# Patient Record
Sex: MALE | Race: WHITE | NOT HISPANIC OR LATINO | URBAN - METROPOLITAN AREA
[De-identification: names, ages, dates, MRNs, and addresses within clinical notes are randomized per-mention and may not be internally consistent; named-entity substitution may affect disease eponyms.]

---

## 2021-07-03 ENCOUNTER — EMERGENCY (EMERGENCY)
Facility: HOSPITAL | Age: 61
LOS: 0 days | Discharge: HOME | End: 2021-07-03
Attending: EMERGENCY MEDICINE
Payer: MEDICAID

## 2021-07-03 VITALS
TEMPERATURE: 98 F | DIASTOLIC BLOOD PRESSURE: 85 MMHG | SYSTOLIC BLOOD PRESSURE: 154 MMHG | HEART RATE: 74 BPM | OXYGEN SATURATION: 100 % | RESPIRATION RATE: 16 BRPM

## 2021-07-03 VITALS — WEIGHT: 195.11 LBS

## 2021-07-03 DIAGNOSIS — R07.89 OTHER CHEST PAIN: ICD-10-CM

## 2021-07-03 DIAGNOSIS — I10 ESSENTIAL (PRIMARY) HYPERTENSION: ICD-10-CM

## 2021-07-03 LAB
ALBUMIN SERPL ELPH-MCNC: 4.5 G/DL — SIGNIFICANT CHANGE UP (ref 3.3–5)
ALP SERPL-CCNC: 57 U/L — SIGNIFICANT CHANGE UP (ref 40–120)
ALT FLD-CCNC: 33 U/L — SIGNIFICANT CHANGE UP (ref 12–78)
ANION GAP SERPL CALC-SCNC: 7 MMOL/L — SIGNIFICANT CHANGE UP (ref 5–17)
AST SERPL-CCNC: 25 U/L — SIGNIFICANT CHANGE UP (ref 15–37)
BASOPHILS # BLD AUTO: 0.02 K/UL — SIGNIFICANT CHANGE UP (ref 0–0.2)
BASOPHILS NFR BLD AUTO: 0.3 % — SIGNIFICANT CHANGE UP (ref 0–2)
BILIRUB SERPL-MCNC: 0.7 MG/DL — SIGNIFICANT CHANGE UP (ref 0.2–1.2)
BUN SERPL-MCNC: 21 MG/DL — SIGNIFICANT CHANGE UP (ref 7–23)
CALCIUM SERPL-MCNC: 9.6 MG/DL — SIGNIFICANT CHANGE UP (ref 8.5–10.1)
CHLORIDE SERPL-SCNC: 104 MMOL/L — SIGNIFICANT CHANGE UP (ref 96–108)
CO2 SERPL-SCNC: 25 MMOL/L — SIGNIFICANT CHANGE UP (ref 22–31)
CREAT SERPL-MCNC: 1.19 MG/DL — SIGNIFICANT CHANGE UP (ref 0.5–1.3)
EOSINOPHIL # BLD AUTO: 0.14 K/UL — SIGNIFICANT CHANGE UP (ref 0–0.5)
EOSINOPHIL NFR BLD AUTO: 1.9 % — SIGNIFICANT CHANGE UP (ref 0–6)
GLUCOSE SERPL-MCNC: 103 MG/DL — HIGH (ref 70–99)
HCT VFR BLD CALC: 40.2 % — SIGNIFICANT CHANGE UP (ref 39–50)
HGB BLD-MCNC: 14.5 G/DL — SIGNIFICANT CHANGE UP (ref 13–17)
IMM GRANULOCYTES NFR BLD AUTO: 0.4 % — SIGNIFICANT CHANGE UP (ref 0–1.5)
LIDOCAIN IGE QN: 133 U/L — SIGNIFICANT CHANGE UP (ref 73–393)
LYMPHOCYTES # BLD AUTO: 1.34 K/UL — SIGNIFICANT CHANGE UP (ref 1–3.3)
LYMPHOCYTES # BLD AUTO: 18.2 % — SIGNIFICANT CHANGE UP (ref 13–44)
MAGNESIUM SERPL-MCNC: 2.1 MG/DL — SIGNIFICANT CHANGE UP (ref 1.6–2.6)
MCHC RBC-ENTMCNC: 34.2 PG — HIGH (ref 27–34)
MCHC RBC-ENTMCNC: 36.1 GM/DL — HIGH (ref 32–36)
MCV RBC AUTO: 94.8 FL — SIGNIFICANT CHANGE UP (ref 80–100)
MONOCYTES # BLD AUTO: 0.63 K/UL — SIGNIFICANT CHANGE UP (ref 0–0.9)
MONOCYTES NFR BLD AUTO: 8.5 % — SIGNIFICANT CHANGE UP (ref 2–14)
NEUTROPHILS # BLD AUTO: 5.22 K/UL — SIGNIFICANT CHANGE UP (ref 1.8–7.4)
NEUTROPHILS NFR BLD AUTO: 70.7 % — SIGNIFICANT CHANGE UP (ref 43–77)
NT-PROBNP SERPL-SCNC: 22 PG/ML — SIGNIFICANT CHANGE UP (ref 0–125)
PLATELET # BLD AUTO: 191 K/UL — SIGNIFICANT CHANGE UP (ref 150–400)
POTASSIUM SERPL-MCNC: 4.1 MMOL/L — SIGNIFICANT CHANGE UP (ref 3.5–5.3)
POTASSIUM SERPL-SCNC: 4.1 MMOL/L — SIGNIFICANT CHANGE UP (ref 3.5–5.3)
PROT SERPL-MCNC: 7.5 GM/DL — SIGNIFICANT CHANGE UP (ref 6–8.3)
RBC # BLD: 4.24 M/UL — SIGNIFICANT CHANGE UP (ref 4.2–5.8)
RBC # FLD: 12.2 % — SIGNIFICANT CHANGE UP (ref 10.3–14.5)
SODIUM SERPL-SCNC: 136 MMOL/L — SIGNIFICANT CHANGE UP (ref 135–145)
TROPONIN I SERPL-MCNC: <0.015 NG/ML — SIGNIFICANT CHANGE UP (ref 0.01–0.04)
TROPONIN I SERPL-MCNC: <0.015 NG/ML — SIGNIFICANT CHANGE UP (ref 0.01–0.04)
WBC # BLD: 7.38 K/UL — SIGNIFICANT CHANGE UP (ref 3.8–10.5)
WBC # FLD AUTO: 7.38 K/UL — SIGNIFICANT CHANGE UP (ref 3.8–10.5)

## 2021-07-03 PROCEDURE — 83690 ASSAY OF LIPASE: CPT

## 2021-07-03 PROCEDURE — 99284 EMERGENCY DEPT VISIT MOD MDM: CPT | Mod: 25

## 2021-07-03 PROCEDURE — 71045 X-RAY EXAM CHEST 1 VIEW: CPT | Mod: 26

## 2021-07-03 PROCEDURE — 83880 ASSAY OF NATRIURETIC PEPTIDE: CPT

## 2021-07-03 PROCEDURE — 80053 COMPREHEN METABOLIC PANEL: CPT

## 2021-07-03 PROCEDURE — 85025 COMPLETE CBC W/AUTO DIFF WBC: CPT

## 2021-07-03 PROCEDURE — 99285 EMERGENCY DEPT VISIT HI MDM: CPT

## 2021-07-03 PROCEDURE — 71045 X-RAY EXAM CHEST 1 VIEW: CPT

## 2021-07-03 PROCEDURE — 93005 ELECTROCARDIOGRAM TRACING: CPT

## 2021-07-03 PROCEDURE — 36415 COLL VENOUS BLD VENIPUNCTURE: CPT

## 2021-07-03 PROCEDURE — 93010 ELECTROCARDIOGRAM REPORT: CPT

## 2021-07-03 PROCEDURE — 83735 ASSAY OF MAGNESIUM: CPT

## 2021-07-03 PROCEDURE — 84484 ASSAY OF TROPONIN QUANT: CPT

## 2021-07-03 RX ORDER — ASPIRIN/CALCIUM CARB/MAGNESIUM 324 MG
324 TABLET ORAL ONCE
Refills: 0 | Status: COMPLETED | OUTPATIENT
Start: 2021-07-03 | End: 2021-07-03

## 2021-07-03 RX ADMIN — Medication 324 MILLIGRAM(S): at 10:12

## 2021-07-03 NOTE — ED PROVIDER NOTE - NS_ ATTENDINGSCRIBEDETAILS _ED_A_ED_FT
I, Phani Romo MD,  performed the initial face to face bedside interview with this patient regarding history of present illness, review of symptoms and relevant past medical, social and family history.  I completed an independent physical examination.  I was the initial provider who evaluated this patient.  The history, relevant review of systems, past medical and surgical history, medical decision making, and physical examination was documented by the scribe in my presence and I attest to the accuracy of the documentation.

## 2021-07-03 NOTE — ED PROVIDER NOTE - NSFOLLOWUPINSTRUCTIONS_ED_ALL_ED_FT
1. return for worsening symptoms or anything concerning to you  2. take all home meds as prescribed  3. follow up with your pmd call to make an appointment  4. follow up with cardiology see attached    Chest Pain    WHAT YOU NEED TO KNOW:    Chest pain can be caused by a range of conditions, from not serious to life-threatening. Chest pain can be a symptom of a digestive problem, such as acid reflux or a stomach ulcer. An anxiety attack or a strong emotion, such as anger, can also cause chest pain. Infection, inflammation, or a fracture in the bones or cartilage in your chest can cause pain or discomfort. Sometimes chest pain or pressure is caused by poor blood flow to your heart (angina). Chest pain may also be caused by life-threatening conditions such as a heart attack or blood clot in your lungs.     DISCHARGE INSTRUCTIONS:    Call 911 if:     You have any of the following signs of a heart attack:   Squeezing, pressure, or pain in your chest       and any of the following:   Discomfort or pain in your back, neck, jaw, stomach, or arm       Shortness of breath      Nausea or vomiting      Lightheadedness or a sudden cold sweat        Return to the emergency department if:     You have chest discomfort that gets worse, even with medicine.      You cough or vomit blood.       Your bowel movements are black or bloody.       You cannot stop vomiting, or it hurts to swallow.     Contact your healthcare provider if:     You have questions or concerns about your condition or care.        Medicines:     Medicines may be given to treat the cause of your chest pain. Examples include pain medicine, anxiety medicine, or medicines to increase blood flow to your heart.       Do not take certain medicines without asking your healthcare provider first. These include NSAIDs, herbal or vitamin supplements, or hormones (estrogen or progestin).       Take your medicine as directed. Contact your healthcare provider if you think your medicine is not helping or if you have side effects. Tell him or her if you are allergic to any medicine. Keep a list of the medicines, vitamins, and herbs you take. Include the amounts, and when and why you take them. Bring the list or the pill bottles to follow-up visits. Carry your medicine list with you in case of an emergency.    Follow up with your healthcare provider within 72 hours, or as directed: You may need to return for more tests to find the cause of your chest pain. You may be referred to a specialist, such as a cardiologist or gastroenterologist. Write down your questions so you remember to ask them during your visits.     Healthy living tips: The following are general healthy guidelines. If your chest pain is caused by a heart problem, your healthcare provider will give you specific guidelines to follow.    Do not smoke. Nicotine and other chemicals in cigarettes and cigars can cause lung and heart damage. Ask your healthcare provider for information if you currently smoke and need help to quit. E-cigarettes or smokeless tobacco still contain nicotine. Talk to your healthcare provider before you use these products.       Eat a variety of healthy, low-fat, low-salt foods. Healthy foods include fruits, vegetables, whole-grain breads, low-fat dairy products, beans, lean meats, and fish. Ask for more information about a heart healthy diet.      Drink plenty of water every day. Your body is made of mostly water. Water helps your body to control your temperature and blood pressure. Ask your healthcare provider how much water you should drink every day.      Ask about activity. Your healthcare provider will tell you which activities to limit or avoid. Ask when you can drive, return to work, and have sex. Ask about the best exercise plan for you.      Maintain a healthy weight. Ask your healthcare provider how much you should weigh. Ask him or her to help you create a weight loss plan if you are overweight.       Get the flu and pneumonia vaccines. All adults should get the influenza (flu) vaccine. Get it every year as soon as it becomes available. The pneumococcal vaccine is given to adults aged 65 years or older. The vaccine is given every 5 years to prevent pneumococcal disease, such as pneumonia.    If you have a stent:     Carry your stent card with you at all times.       Let all healthcare providers know that you have a stent.

## 2021-07-03 NOTE — ED ADULT NURSE NOTE - TEMPLATE
Mirvaso Counseling: Mirvaso is a topical medication which can decrease superficial blood flow where applied. Side effects are uncommon and include stinging, redness and allergic reactions. Cardiac

## 2021-07-03 NOTE — ED PROVIDER NOTE - NS ED ROS FT
Constitutional: No fever or chills  Eyes: No visual changes  HEENT: No throat pain  CV: + chest pain  Resp: No SOB no cough  GI: No abd pain, nausea or vomiting  : No dysuria  MSK: No musculoskeletal pain  Skin: No rash  Neuro: No headache Constitutional: No fever or chills  Eyes: No visual changes  HEENT: No throat pain  CV: + chest pain, + chest tightness  Resp: No SOB no cough  GI: No abd pain, nausea or vomiting  : No dysuria  MSK: No musculoskeletal pain  Skin: No rash  Neuro: No headache

## 2021-07-03 NOTE — ED PROVIDER NOTE - CARE PROVIDER_API CALL
Antonio Pimentel (MD)  Cardiovascular Disease  [address], [address]  [place] [address]  Phone: [phone]  Fax: [phone]  Follow Up Time: 1-3 Days

## 2021-07-03 NOTE — ED PROVIDER NOTE - OBJECTIVE STATEMENT
62 y/o M with PMHx of hypertension presents to the ED c/o chest pain and constant chest tightness since this morning. Pt states he had a EKG done a couple weeks ago which showed abnormalities. This morning, pt states he woke up with tightness in chest.  He reports he went to the gym and did a cardio workout for 45 minutes and was still experiencing chest tightness.    Denies nausea, sweating, dizziness, sob. 62 y/o M with PMHx of hypertension presents to the ED c/o chest pain and constant chest tightness since waking up this morning. Pt states he had a EKG done a couple weeks ago which showed abnormalities. This morning, pt states he woke up with tightness in chest.  He reports he went to the gym and did a cardio workout for 45 minutes and was still experiencing chest tightness.    Denies nausea, sweating, dizziness, sob. 62 y/o M with PMHx of hypertension presents to the ED c/o left side chest pain and chest tightness which has been constant since this morning. Pt states he had a EKG done a couple weeks ago which showed abnormalities. This morning, pt states he woke up with tightness in chest.  He reports he went to the gym and did a cardio workout for 45 minutes and was still experiencing chest tightness.    Denies nausea, sweating, dizziness, sob. 62 y/o M with PMHx of hypertension presents to the ED c/o left side chest pain and chest tightness which has been constant since this morning. Pt states he had a EKG done a couple weeks ago which showed abnormalities. This morning, pt states he woke up with tightness in chest.  He reports he went to the gym and did a cardio workout for 45 minutes and was still experiencing chest tightness.  now resolved. non-radiating mild   Denies nausea, sweating, dizziness, sob.

## 2021-07-03 NOTE — ED PROVIDER NOTE - PROGRESS NOTE DETAILS
trop negative x 2 pt feeling well no tele events vss. will dc with cards follow up. Phani Romo M.D., Attending Physician

## 2021-07-03 NOTE — ED PROVIDER NOTE - PHYSICAL EXAMINATION
Constitutional: NAD AAOx3  Eyes: PERRLA EOMI  Head: Normocephalic atraumatic  Mouth: MMM  Cardiac: regular rate   Resp: Lungs CTAB  GI: Abd s/nt/nd  Neuro: CN2-12 intact  Skin: No rashes Constitutional: NAD AAOx3  Eyes: PERRLA EOMI  Head: Normocephalic atraumatic  Mouth: MMM  Cardiac: regular rate normal peripheral pulses no LE swelling  Resp: Lungs CTAB  GI: Abd s/nt/nd  Neuro: CN2-12 intact  Skin: No rashes

## 2021-07-03 NOTE — ED PROVIDER NOTE - PATIENT PORTAL LINK FT
You can access the FollowMyHealth Patient Portal offered by [facility] by registering at the following website: [email or web address]. By joining [facility]’s FollowMyHealth portal, you will also be able to view your health information using other applications (apps) compatible with our system.

## 2021-07-03 NOTE — ED PROVIDER NOTE - CLINICAL SUMMARY MEDICAL DECISION MAKING FREE TEXT BOX
62 y/o M with PMHx of hypertension presents to the ED with left side chest pain, which was constant this morning. Started before exercise and has now improved after exercising for 45 minutes. Exam nonfocal, heart score 3. Will delta trop, reassess.

## 2021-07-03 NOTE — ED ADULT TRIAGE NOTE - CHIEF COMPLAINT QUOTE
pt presents to ED due to chest pain and tightness started this AM when he woke up pt states he was seen last week and had an irregular heartbeat

## 2021-07-03 NOTE — ED ADULT NURSE NOTE - OBJECTIVE STATEMENT
Patient states he went to the gym this morning; upon returning he felt some "chest pressure" and "not feeling right" Patient also states he had an EKG 3 weeks ago which showed "changes" and was supposed to make an appt with his cardiologist but has not. Patient color good . Sinus rhythm on the cardiac monitor

## 2021-09-15 PROBLEM — Z00.00 ENCOUNTER FOR PREVENTIVE HEALTH EXAMINATION: Status: ACTIVE | Noted: 2021-09-15

## 2021-09-16 PROBLEM — I10 ESSENTIAL (PRIMARY) HYPERTENSION: Chronic | Status: ACTIVE | Noted: 2021-07-03

## 2021-09-22 ENCOUNTER — NON-APPOINTMENT (OUTPATIENT)
Age: 61
End: 2021-09-22

## 2021-09-22 ENCOUNTER — APPOINTMENT (OUTPATIENT)
Dept: RHEUMATOLOGY | Facility: CLINIC | Age: 61
End: 2021-09-22
Payer: MEDICAID

## 2021-09-22 VITALS
TEMPERATURE: 98 F | HEART RATE: 88 BPM | HEIGHT: 73 IN | BODY MASS INDEX: 27.83 KG/M2 | DIASTOLIC BLOOD PRESSURE: 103 MMHG | SYSTOLIC BLOOD PRESSURE: 178 MMHG | WEIGHT: 210 LBS | OXYGEN SATURATION: 98 %

## 2021-09-22 PROCEDURE — 99204 OFFICE O/P NEW MOD 45 MIN: CPT

## 2021-09-27 ENCOUNTER — APPOINTMENT (OUTPATIENT)
Dept: RADIOLOGY | Facility: CLINIC | Age: 61
End: 2021-09-27
Payer: MEDICAID

## 2021-09-27 ENCOUNTER — OUTPATIENT (OUTPATIENT)
Dept: OUTPATIENT SERVICES | Facility: HOSPITAL | Age: 61
LOS: 1 days | End: 2021-09-27
Payer: MEDICAID

## 2021-09-27 DIAGNOSIS — M10.9 GOUT, UNSPECIFIED: ICD-10-CM

## 2021-09-27 PROCEDURE — 73620 X-RAY EXAM OF FOOT: CPT

## 2021-09-27 PROCEDURE — 73620 X-RAY EXAM OF FOOT: CPT | Mod: 26,50

## 2021-10-07 ENCOUNTER — INPATIENT (INPATIENT)
Facility: HOSPITAL | Age: 61
LOS: 2 days | Discharge: HOME HEALTH | DRG: 197 | End: 2021-10-10
Attending: INTERNAL MEDICINE | Admitting: INTERNAL MEDICINE
Payer: MEDICAID

## 2021-10-07 VITALS
RESPIRATION RATE: 18 BRPM | DIASTOLIC BLOOD PRESSURE: 115 MMHG | SYSTOLIC BLOOD PRESSURE: 196 MMHG | HEART RATE: 102 BPM | WEIGHT: 211.86 LBS | TEMPERATURE: 98 F | HEIGHT: 73 IN

## 2021-10-07 DIAGNOSIS — I26.99 OTHER PULMONARY EMBOLISM WITHOUT ACUTE COR PULMONALE: ICD-10-CM

## 2021-10-07 LAB
ALBUMIN SERPL ELPH-MCNC: 4.3 G/DL — SIGNIFICANT CHANGE UP (ref 3.3–5)
ALP SERPL-CCNC: 76 U/L — SIGNIFICANT CHANGE UP (ref 40–120)
ALT FLD-CCNC: 36 U/L — SIGNIFICANT CHANGE UP (ref 12–78)
ANION GAP SERPL CALC-SCNC: 7 MMOL/L — SIGNIFICANT CHANGE UP (ref 5–17)
APTT BLD: 23 SEC — LOW (ref 27.5–35.5)
AST SERPL-CCNC: 26 U/L — SIGNIFICANT CHANGE UP (ref 15–37)
BASOPHILS # BLD AUTO: 0.03 K/UL — SIGNIFICANT CHANGE UP (ref 0–0.2)
BASOPHILS NFR BLD AUTO: 0.4 % — SIGNIFICANT CHANGE UP (ref 0–2)
BILIRUB SERPL-MCNC: 0.8 MG/DL — SIGNIFICANT CHANGE UP (ref 0.2–1.2)
BUN SERPL-MCNC: 17 MG/DL — SIGNIFICANT CHANGE UP (ref 7–23)
CALCIUM SERPL-MCNC: 9.5 MG/DL — SIGNIFICANT CHANGE UP (ref 8.5–10.1)
CHLORIDE SERPL-SCNC: 107 MMOL/L — SIGNIFICANT CHANGE UP (ref 96–108)
CO2 SERPL-SCNC: 26 MMOL/L — SIGNIFICANT CHANGE UP (ref 22–31)
CREAT SERPL-MCNC: 1.11 MG/DL — SIGNIFICANT CHANGE UP (ref 0.5–1.3)
EOSINOPHIL # BLD AUTO: 0.18 K/UL — SIGNIFICANT CHANGE UP (ref 0–0.5)
EOSINOPHIL NFR BLD AUTO: 2.4 % — SIGNIFICANT CHANGE UP (ref 0–6)
GLUCOSE SERPL-MCNC: 107 MG/DL — HIGH (ref 70–99)
HCT VFR BLD CALC: 36.3 % — LOW (ref 39–50)
HGB BLD-MCNC: 12.8 G/DL — LOW (ref 13–17)
IMM GRANULOCYTES NFR BLD AUTO: 0.3 % — SIGNIFICANT CHANGE UP (ref 0–1.5)
INR BLD: 1.1 RATIO — SIGNIFICANT CHANGE UP (ref 0.88–1.16)
LYMPHOCYTES # BLD AUTO: 1.13 K/UL — SIGNIFICANT CHANGE UP (ref 1–3.3)
LYMPHOCYTES # BLD AUTO: 14.8 % — SIGNIFICANT CHANGE UP (ref 13–44)
MAGNESIUM SERPL-MCNC: 2 MG/DL — SIGNIFICANT CHANGE UP (ref 1.6–2.6)
MCHC RBC-ENTMCNC: 34.1 PG — HIGH (ref 27–34)
MCHC RBC-ENTMCNC: 35.3 GM/DL — SIGNIFICANT CHANGE UP (ref 32–36)
MCV RBC AUTO: 96.8 FL — SIGNIFICANT CHANGE UP (ref 80–100)
MONOCYTES # BLD AUTO: 0.65 K/UL — SIGNIFICANT CHANGE UP (ref 0–0.9)
MONOCYTES NFR BLD AUTO: 8.5 % — SIGNIFICANT CHANGE UP (ref 2–14)
NEUTROPHILS # BLD AUTO: 5.61 K/UL — SIGNIFICANT CHANGE UP (ref 1.8–7.4)
NEUTROPHILS NFR BLD AUTO: 73.6 % — SIGNIFICANT CHANGE UP (ref 43–77)
NT-PROBNP SERPL-SCNC: 118 PG/ML — SIGNIFICANT CHANGE UP (ref 0–125)
PLATELET # BLD AUTO: 191 K/UL — SIGNIFICANT CHANGE UP (ref 150–400)
POTASSIUM SERPL-MCNC: 3.7 MMOL/L — SIGNIFICANT CHANGE UP (ref 3.5–5.3)
POTASSIUM SERPL-SCNC: 3.7 MMOL/L — SIGNIFICANT CHANGE UP (ref 3.5–5.3)
PROT SERPL-MCNC: 7.8 GM/DL — SIGNIFICANT CHANGE UP (ref 6–8.3)
PROTHROM AB SERPL-ACNC: 12.7 SEC — SIGNIFICANT CHANGE UP (ref 10.6–13.6)
RBC # BLD: 3.75 M/UL — LOW (ref 4.2–5.8)
RBC # FLD: 12.3 % — SIGNIFICANT CHANGE UP (ref 10.3–14.5)
SODIUM SERPL-SCNC: 140 MMOL/L — SIGNIFICANT CHANGE UP (ref 135–145)
TROPONIN I, HIGH SENSITIVITY RESULT: 14 NG/L — SIGNIFICANT CHANGE UP
WBC # BLD: 7.62 K/UL — SIGNIFICANT CHANGE UP (ref 3.8–10.5)
WBC # FLD AUTO: 7.62 K/UL — SIGNIFICANT CHANGE UP (ref 3.8–10.5)

## 2021-10-07 PROCEDURE — U0005: CPT

## 2021-10-07 PROCEDURE — 86769 SARS-COV-2 COVID-19 ANTIBODY: CPT

## 2021-10-07 PROCEDURE — 36415 COLL VENOUS BLD VENIPUNCTURE: CPT

## 2021-10-07 PROCEDURE — 86803 HEPATITIS C AB TEST: CPT

## 2021-10-07 PROCEDURE — 85027 COMPLETE CBC AUTOMATED: CPT

## 2021-10-07 PROCEDURE — 71275 CT ANGIOGRAPHY CHEST: CPT | Mod: 26,MA

## 2021-10-07 PROCEDURE — 99285 EMERGENCY DEPT VISIT HI MDM: CPT

## 2021-10-07 PROCEDURE — 93306 TTE W/DOPPLER COMPLETE: CPT

## 2021-10-07 PROCEDURE — 74176 CT ABD & PELVIS W/O CONTRAST: CPT

## 2021-10-07 PROCEDURE — 80048 BASIC METABOLIC PNL TOTAL CA: CPT

## 2021-10-07 PROCEDURE — U0003: CPT

## 2021-10-07 PROCEDURE — 80053 COMPREHEN METABOLIC PANEL: CPT

## 2021-10-07 PROCEDURE — 85730 THROMBOPLASTIN TIME PARTIAL: CPT

## 2021-10-07 PROCEDURE — 93010 ELECTROCARDIOGRAM REPORT: CPT

## 2021-10-07 RX ORDER — HEPARIN SODIUM 5000 [USP'U]/ML
7500 INJECTION INTRAVENOUS; SUBCUTANEOUS EVERY 6 HOURS
Refills: 0 | Status: DISCONTINUED | OUTPATIENT
Start: 2021-10-07 | End: 2021-10-08

## 2021-10-07 RX ORDER — AMLODIPINE BESYLATE 2.5 MG/1
5 TABLET ORAL ONCE
Refills: 0 | Status: COMPLETED | OUTPATIENT
Start: 2021-10-07 | End: 2021-10-07

## 2021-10-07 RX ORDER — HEPARIN SODIUM 5000 [USP'U]/ML
7500 INJECTION INTRAVENOUS; SUBCUTANEOUS ONCE
Refills: 0 | Status: COMPLETED | OUTPATIENT
Start: 2021-10-07 | End: 2021-10-07

## 2021-10-07 RX ORDER — HEPARIN SODIUM 5000 [USP'U]/ML
INJECTION INTRAVENOUS; SUBCUTANEOUS
Qty: 25000 | Refills: 0 | Status: DISCONTINUED | OUTPATIENT
Start: 2021-10-07 | End: 2021-10-08

## 2021-10-07 RX ORDER — HEPARIN SODIUM 5000 [USP'U]/ML
3500 INJECTION INTRAVENOUS; SUBCUTANEOUS EVERY 6 HOURS
Refills: 0 | Status: DISCONTINUED | OUTPATIENT
Start: 2021-10-07 | End: 2021-10-08

## 2021-10-07 RX ADMIN — AMLODIPINE BESYLATE 5 MILLIGRAM(S): 2.5 TABLET ORAL at 18:54

## 2021-10-07 RX ADMIN — Medication 10 MILLIGRAM(S): at 18:54

## 2021-10-07 RX ADMIN — HEPARIN SODIUM 1700 UNIT(S)/HR: 5000 INJECTION INTRAVENOUS; SUBCUTANEOUS at 21:17

## 2021-10-07 RX ADMIN — HEPARIN SODIUM 7500 UNIT(S): 5000 INJECTION INTRAVENOUS; SUBCUTANEOUS at 21:17

## 2021-10-07 NOTE — ED STATDOCS - PHYSICAL EXAMINATION
*GEN: No acute distress, well appearing   *HEAD: Normocephalic, Atraumatic  *EYES/NOSE: b/l Pupils symmetric & Reactive to ligth, EOMI b/l  *THROAT: airway patent, moist mucous membranes  *NECK: Neck supple  *PULMONARY: No Respiratory distress, symmetric b/l chest rise  *CARDIAC: s1s2, regular rhythm   *ABDOMEN:  Non Tender, Non Distended, soft, no guarding, no rebound, no masses   *BACK: no CVA tenderness, No midline vertebral tenderness to palpation   *EXTREMITIES: symmetric pulses, 2+ DP & radial pulses, no cyanosis. (+) R LE edema  *SKIN: no rash, no bruising   *NEUROLOGIC: alert,  full active & passive ROM in all 4 extremities,   *PSYCH: appropriate concern about symptoms, pleasant

## 2021-10-07 NOTE — ED ADULT NURSE NOTE - OBJECTIVE STATEMENT
Patient states he was sent here by his primary physician for a "blood clot" in his right leg. Patient is alert and oriented right leg swelling noted

## 2021-10-07 NOTE — ED STATDOCS - CARE PLAN
1 Principal Discharge DX:	Deep vein thrombosis (DVT) of femoral vein of right lower extremity  Secondary Diagnosis:	Leg swelling   Principal Discharge DX:	Multiple pulmonary emboli  Secondary Diagnosis:	Leg swelling  Secondary Diagnosis:	DVT, lower extremity

## 2021-10-07 NOTE — ED STATDOCS - PROGRESS NOTE DETAILS
60 yo male with a PMH of htn, gout presents with RLE swelling x 1 week. Pt was sent for outpt sono which showed dvt to the R common femoral and R popliteal. Denies cp, sob, fever, cough. WIll check labs, CT, meds for his htn and reeval. Pt hypertensive because states that he is nervous and did take hies medication today. -Damien Carrillo PA-C Spoke with Dr. Menezes. States no indication for echo. States to start heparin and admit to CICU/stepdown. -Damien Carrillo PA-C

## 2021-10-07 NOTE — ED STATDOCS - CLINICAL SUMMARY MEDICAL DECISION MAKING FREE TEXT BOX
Extensive R LE DVT. Pt without any CP, difficulty breathing, but it tachycardic. Will get CTA. Extensive R LE DVT. Pt without any CP, difficulty breathing, but is tachycardic. Will get CTA to eval for PE

## 2021-10-07 NOTE — ED STATDOCS - ATTENDING CONTRIBUTION TO CARE
I, Jc Pollock MD,  performed the initial face to face bedside interview with this patient regarding history of present illness, review of symptoms and relevant past medical, social and family history.  I completed an independent physical examination.  I was the initial provider who evaluated this patient. I have signed out the follow up of any pending tests (i.e. labs, radiological studies) to the ACP.  The ACP will complete the work up, interpret tests, administer medications if necessary and reassess the patient for an appropriate disposition.  If questions arise the ACP is expected to contact me for consultation. In the current work-flow I usually don't get to speak to nor reassess patients for final disposition once I sign the case out to the ACP (Unless otherwise specifically documented by me).

## 2021-10-07 NOTE — ED STATDOCS - OBJECTIVE STATEMENT
Pertinent HPI/PMH/PSH/FHx/SHx and Review of Systems contained within  HPI:  Patient p/w CC R LE swelling x 1 week, new onset. OP sono showed a DVT to the R LE. The OP sono impression: positive for extensive deep venous thrombosis extending from the common femoral vein, superficial femoral vein, popliteal to the calf veins. No CP, difficulty breathing. Denies long distance travel or long car rides recently. Denies hx of DVT. NKDA.   PMH/PSH relevant for:  HTN on Vasotec, Gout on Allopurinol   ROS negative for: fever, Chest pain, SOB, Nausea, vomiting, diarrhea, abdominal pain, dysuria    FamilyHx and SocialHx not otherwise contributory

## 2021-10-07 NOTE — ED STATDOCS - NS ED ROS FT
Review of Systems:  	•	CONSTITUTIONAL: no fever  	•	SKIN: no rash  	•	RESPIRATORY: no shortness of breath  	•	CARDIAC: no chest pain  	•	GI:  no abd pain, no nausea, no vomiting, no diarrhea  	•	GENITO-URINARY:  no dysuria  	•	MUSCULOSKELETAL:  no back pain. (+) R LE swelling  	•	NEUROLOGIC: no weakness  	•	ALLERGY: no rhinorrhea  	•	PSYSCHIATRIC: appropriate concern about symptoms

## 2021-10-07 NOTE — PHARMACOTHERAPY INTERVENTION NOTE - COMMENTS
Med history complete. Medication and allergies review with patient and compared to Drfirst. Medication related questions answered.

## 2021-10-08 LAB
ALBUMIN SERPL ELPH-MCNC: 4 G/DL — SIGNIFICANT CHANGE UP (ref 3.3–5)
ALP SERPL-CCNC: 75 U/L — SIGNIFICANT CHANGE UP (ref 40–120)
ALT FLD-CCNC: 29 U/L — SIGNIFICANT CHANGE UP (ref 12–78)
ANION GAP SERPL CALC-SCNC: 8 MMOL/L — SIGNIFICANT CHANGE UP (ref 5–17)
APTT BLD: 109.9 SEC — HIGH (ref 27.5–35.5)
APTT BLD: 72.5 SEC — HIGH (ref 27.5–35.5)
AST SERPL-CCNC: 26 U/L — SIGNIFICANT CHANGE UP (ref 15–37)
BILIRUB SERPL-MCNC: 0.9 MG/DL — SIGNIFICANT CHANGE UP (ref 0.2–1.2)
BUN SERPL-MCNC: 12 MG/DL — SIGNIFICANT CHANGE UP (ref 7–23)
CALCIUM SERPL-MCNC: 9.1 MG/DL — SIGNIFICANT CHANGE UP (ref 8.5–10.1)
CHLORIDE SERPL-SCNC: 105 MMOL/L — SIGNIFICANT CHANGE UP (ref 96–108)
CO2 SERPL-SCNC: 25 MMOL/L — SIGNIFICANT CHANGE UP (ref 22–31)
COVID-19 SPIKE DOMAIN AB INTERP: POSITIVE
COVID-19 SPIKE DOMAIN ANTIBODY RESULT: 84.3 U/ML — HIGH
CREAT SERPL-MCNC: 0.95 MG/DL — SIGNIFICANT CHANGE UP (ref 0.5–1.3)
GLUCOSE SERPL-MCNC: 104 MG/DL — HIGH (ref 70–99)
HCT VFR BLD CALC: 35.7 % — LOW (ref 39–50)
HCT VFR BLD CALC: 36.7 % — LOW (ref 39–50)
HCV AB S/CO SERPL IA: 0.07 S/CO — SIGNIFICANT CHANGE UP (ref 0–0.99)
HCV AB SERPL-IMP: SIGNIFICANT CHANGE UP
HGB BLD-MCNC: 12.8 G/DL — LOW (ref 13–17)
HGB BLD-MCNC: 12.9 G/DL — LOW (ref 13–17)
MCHC RBC-ENTMCNC: 33.9 PG — SIGNIFICANT CHANGE UP (ref 27–34)
MCHC RBC-ENTMCNC: 34.6 PG — HIGH (ref 27–34)
MCHC RBC-ENTMCNC: 34.9 GM/DL — SIGNIFICANT CHANGE UP (ref 32–36)
MCHC RBC-ENTMCNC: 36.1 GM/DL — HIGH (ref 32–36)
MCV RBC AUTO: 95.7 FL — SIGNIFICANT CHANGE UP (ref 80–100)
MCV RBC AUTO: 97.1 FL — SIGNIFICANT CHANGE UP (ref 80–100)
PLATELET # BLD AUTO: 203 K/UL — SIGNIFICANT CHANGE UP (ref 150–400)
PLATELET # BLD AUTO: 216 K/UL — SIGNIFICANT CHANGE UP (ref 150–400)
POTASSIUM SERPL-MCNC: 3.7 MMOL/L — SIGNIFICANT CHANGE UP (ref 3.5–5.3)
POTASSIUM SERPL-SCNC: 3.7 MMOL/L — SIGNIFICANT CHANGE UP (ref 3.5–5.3)
PROT SERPL-MCNC: 7.1 GM/DL — SIGNIFICANT CHANGE UP (ref 6–8.3)
RBC # BLD: 3.73 M/UL — LOW (ref 4.2–5.8)
RBC # BLD: 3.78 M/UL — LOW (ref 4.2–5.8)
RBC # FLD: 12.2 % — SIGNIFICANT CHANGE UP (ref 10.3–14.5)
RBC # FLD: 12.3 % — SIGNIFICANT CHANGE UP (ref 10.3–14.5)
SARS-COV-2 IGG+IGM SERPL QL IA: 84.3 U/ML — HIGH
SARS-COV-2 IGG+IGM SERPL QL IA: POSITIVE
SARS-COV-2 RNA SPEC QL NAA+PROBE: SIGNIFICANT CHANGE UP
SODIUM SERPL-SCNC: 138 MMOL/L — SIGNIFICANT CHANGE UP (ref 135–145)
WBC # BLD: 5.88 K/UL — SIGNIFICANT CHANGE UP (ref 3.8–10.5)
WBC # BLD: 6.75 K/UL — SIGNIFICANT CHANGE UP (ref 3.8–10.5)
WBC # FLD AUTO: 5.88 K/UL — SIGNIFICANT CHANGE UP (ref 3.8–10.5)
WBC # FLD AUTO: 6.75 K/UL — SIGNIFICANT CHANGE UP (ref 3.8–10.5)

## 2021-10-08 PROCEDURE — 93306 TTE W/DOPPLER COMPLETE: CPT | Mod: 26

## 2021-10-08 PROCEDURE — 99223 1ST HOSP IP/OBS HIGH 75: CPT

## 2021-10-08 PROCEDURE — 74176 CT ABD & PELVIS W/O CONTRAST: CPT | Mod: 26

## 2021-10-08 PROCEDURE — 12345: CPT | Mod: NC

## 2021-10-08 RX ORDER — POLYETHYLENE GLYCOL 3350 17 G/17G
17 POWDER, FOR SOLUTION ORAL AT BEDTIME
Refills: 0 | Status: DISCONTINUED | OUTPATIENT
Start: 2021-10-08 | End: 2021-10-10

## 2021-10-08 RX ORDER — ALPRAZOLAM 0.25 MG
0.5 TABLET ORAL AT BEDTIME
Refills: 0 | Status: DISCONTINUED | OUTPATIENT
Start: 2021-10-08 | End: 2021-10-10

## 2021-10-08 RX ORDER — LIDOCAINE HCL 20 MG/ML
5 VIAL (ML) INJECTION ONCE
Refills: 0 | Status: DISCONTINUED | OUTPATIENT
Start: 2021-10-08 | End: 2021-10-10

## 2021-10-08 RX ORDER — DIAZEPAM 5 MG
2.5 TABLET ORAL ONCE
Refills: 0 | Status: DISCONTINUED | OUTPATIENT
Start: 2021-10-08 | End: 2021-10-08

## 2021-10-08 RX ORDER — ALLOPURINOL 300 MG
100 TABLET ORAL DAILY
Refills: 0 | Status: DISCONTINUED | OUTPATIENT
Start: 2021-10-08 | End: 2021-10-10

## 2021-10-08 RX ORDER — LANOLIN ALCOHOL/MO/W.PET/CERES
3 CREAM (GRAM) TOPICAL AT BEDTIME
Refills: 0 | Status: DISCONTINUED | OUTPATIENT
Start: 2021-10-08 | End: 2021-10-10

## 2021-10-08 RX ORDER — APIXABAN 2.5 MG/1
10 TABLET, FILM COATED ORAL EVERY 12 HOURS
Refills: 0 | Status: DISCONTINUED | OUTPATIENT
Start: 2021-10-08 | End: 2021-10-10

## 2021-10-08 RX ORDER — ALPRAZOLAM 0.25 MG
0.25 TABLET ORAL ONCE
Refills: 0 | Status: DISCONTINUED | OUTPATIENT
Start: 2021-10-08 | End: 2021-10-08

## 2021-10-08 RX ORDER — INFLUENZA VIRUS VACCINE 15; 15; 15; 15 UG/.5ML; UG/.5ML; UG/.5ML; UG/.5ML
0.5 SUSPENSION INTRAMUSCULAR ONCE
Refills: 0 | Status: DISCONTINUED | OUTPATIENT
Start: 2021-10-08 | End: 2021-10-10

## 2021-10-08 RX ORDER — TAMSULOSIN HYDROCHLORIDE 0.4 MG/1
0.4 CAPSULE ORAL AT BEDTIME
Refills: 0 | Status: DISCONTINUED | OUTPATIENT
Start: 2021-10-08 | End: 2021-10-10

## 2021-10-08 RX ORDER — AMLODIPINE BESYLATE 2.5 MG/1
5 TABLET ORAL DAILY
Refills: 0 | Status: DISCONTINUED | OUTPATIENT
Start: 2021-10-08 | End: 2021-10-10

## 2021-10-08 RX ORDER — ACETAMINOPHEN 500 MG
650 TABLET ORAL EVERY 6 HOURS
Refills: 0 | Status: DISCONTINUED | OUTPATIENT
Start: 2021-10-08 | End: 2021-10-10

## 2021-10-08 RX ORDER — ONDANSETRON 8 MG/1
4 TABLET, FILM COATED ORAL EVERY 8 HOURS
Refills: 0 | Status: DISCONTINUED | OUTPATIENT
Start: 2021-10-08 | End: 2021-10-10

## 2021-10-08 RX ADMIN — Medication 2.5 MILLIGRAM(S): at 18:43

## 2021-10-08 RX ADMIN — TAMSULOSIN HYDROCHLORIDE 0.4 MILLIGRAM(S): 0.4 CAPSULE ORAL at 21:31

## 2021-10-08 RX ADMIN — Medication 0.5 MILLIGRAM(S): at 22:00

## 2021-10-08 RX ADMIN — Medication 100 MILLIGRAM(S): at 09:02

## 2021-10-08 RX ADMIN — Medication 20 MILLIGRAM(S): at 09:03

## 2021-10-08 RX ADMIN — APIXABAN 10 MILLIGRAM(S): 2.5 TABLET, FILM COATED ORAL at 11:45

## 2021-10-08 RX ADMIN — Medication 0.25 MILLIGRAM(S): at 03:59

## 2021-10-08 RX ADMIN — AMLODIPINE BESYLATE 5 MILLIGRAM(S): 2.5 TABLET ORAL at 09:03

## 2021-10-08 RX ADMIN — APIXABAN 10 MILLIGRAM(S): 2.5 TABLET, FILM COATED ORAL at 21:31

## 2021-10-08 RX ADMIN — HEPARIN SODIUM 1500 UNIT(S)/HR: 5000 INJECTION INTRAVENOUS; SUBCUTANEOUS at 04:15

## 2021-10-08 NOTE — H&P ADULT - HISTORY OF PRESENT ILLNESS
62 y/o M PMHx significant for hypertension, and gout presents to  after reportedly being found to have an extensive right lower extremity DVT. The patient states that over the past week he has had c/o right lower extremity swelling; outpatient right lower extremity ultrasound revealed an extensive deep venous thrombosis extending from the common femoral vein, superficial femoral vein, popliteal to the calf veins. The patient denied any associated chest pain, palpitations, pleuritic pain, shortness of breath, recent travel, or prior history of blood clots (DVTs/PEs). CTA Chest => revealed bilateral pulmonary embolism involving the segmental and subsegmental branches of the bilateral lower lobes, right middle lobe, and signs of mild right heart strain.

## 2021-10-08 NOTE — CHART NOTE - NSCHARTNOTEFT_GEN_A_CORE
Asked by Dr Gonzales to place Coude  catheter for urinary retention.    Pt was prepped and draped in sterile manner and a 16 G Coude Avitia Cathter was placed without difficulty after lidocaine urojet .  > 1400 cc clear yellow urine immediately drained.    A/P:  Urinary retention  Coude Avitia placed  Will start Flomax as discussed with Dr gonzales

## 2021-10-08 NOTE — CHART NOTE - NSCHARTNOTEFT_GEN_A_CORE
HOSPITALIST PA CHART NOTE     Received call from RN that patient with vargas was noted to have punched red color and patient was complaining of pain.     Patient is 62 y/o M admitted with pulmonary embolism, treated on Heparin gtt now on Eliquis.   Patient was having urinary retention and urology was consulted and placed a coude vargas catheter. Vargas initially drained clear yellow urine. Later during day, noted that color started to change to punch color. Urology gave Valium for bladder spasm and encouraged vargas flush with NS prn.     Patient seen and examined at bedside by me. Patient states he had some abdominal discomfort earlier in the day, which has now improved he said the Valium helped him. Denies Chest pain, dizziness, abdominal discomfort, SOB. Does endorse constipation     Physical Exam:   Gen- patient sitting up in bed comfortably, NAD, pleasant, conversational   Resp- lungs are CTA b/l, breathing with normal effort, no accessory muscle usage. No signs of respiratory distress   Cardio- RRR   Abdomen- soft, NT, ND, no pain to palpation   Extremities- no edema   Skin- no pallor. skin is warm, moist and intact. Normal cap refill <2 seconds   : vargas is in place, bag is with punch colored urine. No clots visualized.     Vital Signs Last 24 Hrs  T(C): 36.6 (08 Oct 2021 12:30), Max: 37 (08 Oct 2021 00:30)  T(F): 97.8 (08 Oct 2021 12:30), Max: 98.6 (08 Oct 2021 00:30)  HR: 93 (08 Oct 2021 20:00) (75 - 97)  BP: 179/101 (08 Oct 2021 20:00) (145/94 - 191/96)  BP(mean): 117 (08 Oct 2021 20:00) (92 - 118)  RR: 18 (08 Oct 2021 18:55) (11 - 19)  SpO2: 99% (08 Oct 2021 18:55) (94% - 100%)      Assessment:  62 y/o M with urinary retention s/p coude catheter placement by urology today with initially clear colored urine-> punch colored urine. Unchanged from earlier in the day.     Plan:   Miralax for constipation     Urology on case  H/H is stable. No signs of active bleeding. Patient is hemodynamically stable.   Patient currently denies any pain.   Continue to flush vargas with NS prn   Monitor H/H   Urology following

## 2021-10-08 NOTE — H&P ADULT - ASSESSMENT
60 y/o M PMHx significant for hypertension, and gout presents to  after reportedly being found to have an extensive right lower extremity DVT. The patient states that over the past week he has had c/o right lower extremity swelling; outpatient right lower extremity ultrasound revealed an extensive deep venous thrombosis extending from the common femoral vein, superficial femoral vein, popliteal to the calf veins. The patient denied any associated chest pain, palpitations, pleuritic pain, shortness of breath, recent travel, or prior history of blood clots (DVTs/PEs). CTA Chest => revealed bilateral pulmonary embolism involving the segmental and subsegmental branches of the bilateral lower lobes, right middle lobe, and signs of mild right heart strain. 60 y/o M PMHx significant for hypertension, and gout presents to  after reportedly being found to have an extensive right lower extremity DVT. The patient states that over the past week he has had c/o right lower extremity swelling; outpatient right lower extremity ultrasound revealed an extensive deep venous thrombosis extending from the common femoral vein, superficial femoral vein, popliteal to the calf veins. The patient denied any associated chest pain, palpitations, pleuritic pain, shortness of breath, recent travel, or prior history of blood clots (DVTs/PEs). CTA Chest => revealed bilateral pulmonary embolism involving the segmental and subsegmental branches of the bilateral lower lobes, right middle lobe, and signs of mild right heart strain.    #Bilateral Pulmonary Emboli with signs of right heart strain  #Right Lower Extremity DVT  ~admit to CICU  ~f/u w/ Vascular Surgery  ~f/u 2DECHO  ~cont. heparin gtt per protocol  ~cont. supplemental O2    #Hypertension  ~cont. Enalapril 20mg po daily  ~cont. Amlodipine 5mg po daily    #Gout   ~cont. Allopurinol 100mg po daily    #Vte ppx  ~cont. Heparin gtt per protocol

## 2021-10-08 NOTE — PROVIDER CONTACT NOTE (OTHER) - ACTION/TREATMENT ORDERED:
Will continue to monitor as per PA.
1x vargas irritation of 40 cc of sterile water performed - flushed without resistance and 40 cc of urine drainage into leg bag. 2.5 valium IVP ordered
Spoke with MD Croft- Surgical PA called to place Koude catheter & flomax to be started.

## 2021-10-08 NOTE — H&P ADULT - NSHPPHYSICALEXAM_GEN_ALL_CORE
Vital Signs Last 24 Hrs  T(C): 36.8 (08 Oct 2021 01:00), Max: 37 (08 Oct 2021 00:30)  T(F): 98.2 (08 Oct 2021 01:00), Max: 98.6 (08 Oct 2021 00:30)  HR: 88 (08 Oct 2021 01:00) (82 - 102)  BP: 162/89 (08 Oct 2021 01:00) (157/93 - 196/115)  RR: 17 (08 Oct 2021 01:00) (16 - 18)  SpO2: 99% (08 Oct 2021 01:00) (98% - 99%)

## 2021-10-08 NOTE — PROVIDER CONTACT NOTE (OTHER) - SITUATION
urology consult called - spoke with answering service
notified office; spoke to Mahesh
Ct ab results noted - MD Kapadia made aware
Pt came in for DVt and bilateral PE on heparin till this morning, Now on eliquis. Pt had vargas placed today due to urinary retention.
pt reporting 10 out of 10 discomfort and pain and vargas catheter site.

## 2021-10-08 NOTE — PROVIDER CONTACT NOTE (OTHER) - BACKGROUND
rene catheter placed by Surgical PA @1700 for urinary retention. Avitia originally draining - clear yellow urine, with progression to redd red hematuria.

## 2021-10-08 NOTE — PROVIDER CONTACT NOTE (OTHER) - ASSESSMENT
Urine was blood tinged but became darker within shift. Passed a couple of small clots.
VSS. pt reporting  increased "pressure" "pain" and "discomfort" around vargas catheter area.  bladder scan performed - 3 cc noted on scan. 200 cc of redd red hematuria noted in leg bag.
pt voided 200 cc clear urine- PVR 1660. pt denies symptoms of urinary retention, hesitancy or inability to empty bladder.

## 2021-10-08 NOTE — CHART NOTE - NSCHARTNOTEFT_GEN_A_CORE
Called by RN Pt C/O acute episode lower abd pain and Urine which was clear > 1 hr S/P vargas acutely became pink to punch colored.    Pt now with minimal discomfort. Vargas with punch colored urine, no clots .    Abd: Mild discomfort with palpation, no rebound or guarding.    A/P:  S/P Coude catheter placement for urinary retention urine was clear n now punch colored   Valium 2.5 mg IVP  x 1 for acute bladder spasm  Bladder scan:no residual  flush vargas with NS PRN,

## 2021-10-08 NOTE — PROGRESS NOTE ADULT - NSPROGADDITIONALINFOA_GEN_ALL_CORE
Pt seen and examined with PGY-III Dr. Yee. A&P reviewed.   New DVT/PE - unprovoked - needs hypercoagulable w/u = CT abd/pelvis here and the rest as outpt  F/u TTE  DC planning on DOAC

## 2021-10-09 LAB
ANION GAP SERPL CALC-SCNC: 8 MMOL/L — SIGNIFICANT CHANGE UP (ref 5–17)
BUN SERPL-MCNC: 15 MG/DL — SIGNIFICANT CHANGE UP (ref 7–23)
CALCIUM SERPL-MCNC: 9 MG/DL — SIGNIFICANT CHANGE UP (ref 8.5–10.1)
CHLORIDE SERPL-SCNC: 105 MMOL/L — SIGNIFICANT CHANGE UP (ref 96–108)
CO2 SERPL-SCNC: 23 MMOL/L — SIGNIFICANT CHANGE UP (ref 22–31)
CREAT SERPL-MCNC: 0.93 MG/DL — SIGNIFICANT CHANGE UP (ref 0.5–1.3)
GLUCOSE SERPL-MCNC: 109 MG/DL — HIGH (ref 70–99)
HCT VFR BLD CALC: 38.3 % — LOW (ref 39–50)
HGB BLD-MCNC: 13.4 G/DL — SIGNIFICANT CHANGE UP (ref 13–17)
MCHC RBC-ENTMCNC: 33.9 PG — SIGNIFICANT CHANGE UP (ref 27–34)
MCHC RBC-ENTMCNC: 35 GM/DL — SIGNIFICANT CHANGE UP (ref 32–36)
MCV RBC AUTO: 97 FL — SIGNIFICANT CHANGE UP (ref 80–100)
PLATELET # BLD AUTO: 232 K/UL — SIGNIFICANT CHANGE UP (ref 150–400)
POTASSIUM SERPL-MCNC: 3.9 MMOL/L — SIGNIFICANT CHANGE UP (ref 3.5–5.3)
POTASSIUM SERPL-SCNC: 3.9 MMOL/L — SIGNIFICANT CHANGE UP (ref 3.5–5.3)
RBC # BLD: 3.95 M/UL — LOW (ref 4.2–5.8)
RBC # FLD: 12.4 % — SIGNIFICANT CHANGE UP (ref 10.3–14.5)
SODIUM SERPL-SCNC: 136 MMOL/L — SIGNIFICANT CHANGE UP (ref 135–145)
WBC # BLD: 8.52 K/UL — SIGNIFICANT CHANGE UP (ref 3.8–10.5)
WBC # FLD AUTO: 8.52 K/UL — SIGNIFICANT CHANGE UP (ref 3.8–10.5)

## 2021-10-09 PROCEDURE — 99222 1ST HOSP IP/OBS MODERATE 55: CPT | Mod: 25

## 2021-10-09 PROCEDURE — 51700 IRRIGATION OF BLADDER: CPT

## 2021-10-09 PROCEDURE — 99233 SBSQ HOSP IP/OBS HIGH 50: CPT

## 2021-10-09 RX ORDER — APIXABAN 2.5 MG/1
2 TABLET, FILM COATED ORAL
Qty: 24 | Refills: 0
Start: 2021-10-09 | End: 2021-10-14

## 2021-10-09 RX ORDER — MULTIVIT WITH MIN/MFOLATE/K2 340-15/3 G
1 POWDER (GRAM) ORAL ONCE
Refills: 0 | Status: DISCONTINUED | OUTPATIENT
Start: 2021-10-09 | End: 2021-10-10

## 2021-10-09 RX ORDER — APIXABAN 2.5 MG/1
1 TABLET, FILM COATED ORAL
Qty: 60 | Refills: 0
Start: 2021-10-09 | End: 2021-11-07

## 2021-10-09 RX ADMIN — Medication 0.5 MILLIGRAM(S): at 22:04

## 2021-10-09 RX ADMIN — Medication 3 MILLIGRAM(S): at 02:04

## 2021-10-09 RX ADMIN — APIXABAN 10 MILLIGRAM(S): 2.5 TABLET, FILM COATED ORAL at 21:03

## 2021-10-09 RX ADMIN — POLYETHYLENE GLYCOL 3350 17 GRAM(S): 17 POWDER, FOR SOLUTION ORAL at 09:08

## 2021-10-09 RX ADMIN — Medication 100 MILLIGRAM(S): at 09:08

## 2021-10-09 RX ADMIN — TAMSULOSIN HYDROCHLORIDE 0.4 MILLIGRAM(S): 0.4 CAPSULE ORAL at 21:03

## 2021-10-09 RX ADMIN — AMLODIPINE BESYLATE 5 MILLIGRAM(S): 2.5 TABLET ORAL at 09:08

## 2021-10-09 RX ADMIN — APIXABAN 10 MILLIGRAM(S): 2.5 TABLET, FILM COATED ORAL at 09:08

## 2021-10-09 RX ADMIN — Medication 20 MILLIGRAM(S): at 09:08

## 2021-10-09 NOTE — CONSULT NOTE ADULT - SUBJECTIVE AND OBJECTIVE BOX
Patient is a 61y old  Male who presents with a chief complaint of Right Lower Extremity Swelling (09 Oct 2021 12:54)      Vital Signs Last 24 Hrs  T(C): 36.5 (09 Oct 2021 17:00), Max: 36.6 (08 Oct 2021 20:00)  T(F): 97.7 (09 Oct 2021 17:00), Max: 97.8 (08 Oct 2021 20:00)  HR: 86 (09 Oct 2021 17:00) (80 - 116)  BP: 134/82 (09 Oct 2021 17:00) (113/83 - 191/96)  BP(mean): 96 (09 Oct 2021 17:00) (84 - 118)  RR: 17 (09 Oct 2021 07:00) (17 - 18)  SpO2: 100% (09 Oct 2021 17:00) (95% - 100%)    LABS:                        13.4   8.52  )-----------( 232      ( 09 Oct 2021 06:38 )             38.3     10-09    136  |  105  |  15  ----------------------------<  109<H>  3.9   |  23  |  0.93    Ca    9.0      09 Oct 2021 06:38    TPro  7.1  /  Alb  4.0  /  TBili  0.9  /  DBili  x   /  AST  26  /  ALT  29  /  AlkPhos  75  10-08    PTT - ( 08 Oct 2021 10:18 )  PTT:72.5 sec           CHIEF COMPLAINT:    HISTORY OF PRESENT ILLNESS:    PAST MEDICAL & SURGICAL HISTORY:  Hypertension    Gout    No significant past surgical history        REVIEW OF SYSTEMS:    CONSTITUTIONAL: No weakness, fevers or chills  EYES/ENT: No visual changes;  No vertigo or throat pain   NECK: No pain or stiffness  RESPIRATORY: No cough, wheezing, hemoptysis, No shortness of breath  CARDIOVASCULAR: No chest pain or palpitations  GASTROINTESTINAL: No abdominal or flank pain, No nausea, vomiting, diarrhea or constipation  GENITOURINARY: See HPI  NEUROLOGICAL: No numbness or weakness  SKIN: No rashes or lesions   All other review of systems is negative unless indicated above.    MEDICATIONS  (STANDING):  allopurinol 100 milliGRAM(s) Oral daily  amLODIPine   Tablet 5 milliGRAM(s) Oral daily  apixaban 10 milliGRAM(s) Oral every 12 hours  enalapril 20 milliGRAM(s) Oral daily  influenza   Vaccine 0.5 milliLiter(s) IntraMuscular once  lidocaine 2% Jelly 5 milliLiter(s) IntraUrethral once  tamsulosin 0.4 milliGRAM(s) Oral at bedtime    MEDICATIONS  (PRN):  acetaminophen   Tablet .. 650 milliGRAM(s) Oral every 6 hours PRN Temp greater or equal to 38C (100.4F), Mild Pain (1 - 3)  ALPRAZolam 0.5 milliGRAM(s) Oral at bedtime PRN for insomnia  aluminum hydroxide/magnesium hydroxide/simethicone Suspension 30 milliLiter(s) Oral every 4 hours PRN Dyspepsia  melatonin 3 milliGRAM(s) Oral at bedtime PRN Insomnia  ondansetron Injectable 4 milliGRAM(s) IV Push every 8 hours PRN Nausea and/or Vomiting  polyethylene glycol 3350 17 Gram(s) Oral at bedtime PRN Constipation      Allergies    No Known Allergies    Intolerances        SOCIAL HISTORY:    FAMILY HISTORY:  No pertinent family history in first degree relatives        Vital Signs Last 24 Hrs  T(C): 36.5 (09 Oct 2021 17:00), Max: 36.6 (08 Oct 2021 20:00)  T(F): 97.7 (09 Oct 2021 17:00), Max: 97.8 (08 Oct 2021 20:00)  HR: 86 (09 Oct 2021 17:00) (80 - 116)  BP: 134/82 (09 Oct 2021 17:00) (113/83 - 191/96)  BP(mean): 96 (09 Oct 2021 17:00) (84 - 118)  RR: 17 (09 Oct 2021 07:00) (17 - 18)  SpO2: 100% (09 Oct 2021 17:00) (95% - 100%)    PHYSICAL EXAM:    Constitutional: No acute distress  HEENT: EOMI, Normal Hearing  Neck: Supple  Back: No costovertebral angle tenderness  Respiratory: Normal respiratory effort    Cardiovascular: Normal peripheral circulation   Abd: Soft, non distended, non tender  : Normal urethra, ***circumcised penis, bilateral normal to palpate  COSTA: Prostate- *** gms, non tender, no nodules  Extremities: No peripheral edema  Neurological: No focal deficits  Psychiatric: Normal mood, normal affect  Musculoskeletal: Moving all 4 extremities  Skin: No rashes    LABS:                        13.4   8.52  )-----------( 232      ( 09 Oct 2021 06:38 )             38.3     10-09    136  |  105  |  15  ----------------------------<  109<H>  3.9   |  23  |  0.93    Ca    9.0      09 Oct 2021 06:38    TPro  7.1  /  Alb  4.0  /  TBili  0.9  /  DBili  x   /  AST  26  /  ALT  29  /  AlkPhos  75  10-08    PTT - ( 08 Oct 2021 10:18 )  PTT:72.5 sec    Urine Culture:     RADIOLOGY & ADDITIONAL STUDIES:         CHIEF COMPLAINT:  Urinary retention   Hematuria    HISTORY OF PRESENT ILLNESS:  62 y/o male presented to  after reportedly being found to have an extensive right lower extremity DVT.   On CT scan A&P: bilateral hydroureteronephrosis, distended bladder and enlarged prostate, concern for FREGOSO. Bladder scan 1600 ml. Coude tip Avitia placed drained 1500 ml.   Gross hematuria after Avitia placement after initial clear urine.   Before admission would urinate with reasonable stream, every 3-4 hours or so during the day, on and off hesitancy, nocturia 1 x.   Has on and off Constipation.    PAST MEDICAL & SURGICAL HISTORY:  Hypertension    Gout    No significant past surgical history        REVIEW OF SYSTEMS:  All other review of systems is negative unless indicated above.    MEDICATIONS  (STANDING):  allopurinol 100 milliGRAM(s) Oral daily  amLODIPine   Tablet 5 milliGRAM(s) Oral daily  apixaban 10 milliGRAM(s) Oral every 12 hours  enalapril 20 milliGRAM(s) Oral daily  influenza   Vaccine 0.5 milliLiter(s) IntraMuscular once  lidocaine 2% Jelly 5 milliLiter(s) IntraUrethral once  tamsulosin 0.4 milliGRAM(s) Oral at bedtime    MEDICATIONS  (PRN):  acetaminophen   Tablet .. 650 milliGRAM(s) Oral every 6 hours PRN Temp greater or equal to 38C (100.4F), Mild Pain (1 - 3)  ALPRAZolam 0.5 milliGRAM(s) Oral at bedtime PRN for insomnia  aluminum hydroxide/magnesium hydroxide/simethicone Suspension 30 milliLiter(s) Oral every 4 hours PRN Dyspepsia  melatonin 3 milliGRAM(s) Oral at bedtime PRN Insomnia  ondansetron Injectable 4 milliGRAM(s) IV Push every 8 hours PRN Nausea and/or Vomiting  polyethylene glycol 3350 17 Gram(s) Oral at bedtime PRN Constipation      Allergies    No Known Allergies    Intolerances        SOCIAL HISTORY:    FAMILY HISTORY:  No pertinent family history in first degree relatives        Vital Signs Last 24 Hrs  T(C): 36.5 (09 Oct 2021 17:00), Max: 36.6 (08 Oct 2021 20:00)  T(F): 97.7 (09 Oct 2021 17:00), Max: 97.8 (08 Oct 2021 20:00)  HR: 86 (09 Oct 2021 17:00) (80 - 116)  BP: 134/82 (09 Oct 2021 17:00) (113/83 - 191/96)  BP(mean): 96 (09 Oct 2021 17:00) (84 - 118)  RR: 17 (09 Oct 2021 07:00) (17 - 18)  SpO2: 100% (09 Oct 2021 17:00) (95% - 100%)    PHYSICAL EXAM:    Constitutional: No acute distress  HEENT: EOMI, Normal Hearing  Neck: Supple  Back: No costovertebral angle tenderness  Respiratory: Normal respiratory effort    Cardiovascular: Normal peripheral circulation   Abd: Soft, non distended, non tender  : Normal urethra, penis, bilateral testis normal to palpate  Avitia to drainage bag: blood tinged urine   Extremities: No peripheral edema  Neurological: No focal deficits  Psychiatric: Normal mood, normal affect  Musculoskeletal: Moving all 4 extremities  Skin: No rashes    LABS:                        13.4   8.52  )-----------( 232      ( 09 Oct 2021 06:38 )             38.3     10-09    136  |  105  |  15  ----------------------------<  109<H>  3.9   |  23  |  0.93    Ca    9.0      09 Oct 2021 06:38    TPro  7.1  /  Alb  4.0  /  TBili  0.9  /  DBili  x   /  AST  26  /  ALT  29  /  AlkPhos  75  10-08    PTT - ( 08 Oct 2021 10:18 )  PTT:72.5 sec    < from: CT Abdomen and Pelvis w/ Oral Cont (10.08.21 @ 13:23) >  EXAM:  CT ABDOMEN AND PELVIS OC                            PROCEDURE DATE:  10/08/2021          INTERPRETATION:  CLINICAL INFORMATION: TVT and pulmonary emboli. Assess for malignancy.    COMPARISON: CT pulmonary angiogram dated October 7, 2021.    CONTRAST/COMPLICATIONS:  IV Contrast: NONE  Oral Contrast: Omnipaque 300  Complications: None reported at time of study completion    PROCEDURE:  CT of the Abdomen and Pelvis was performed.  Sagittal and coronal reformats were performed.    FINDINGS:  LOWER CHEST: Within normal limits.    LIVER: Within normal limits.  BILE DUCTS: Normal caliber.  GALLBLADDER: Within normal limits.  SPLEEN: Within normal limits.  PANCREAS: Within normal limits.  ADRENALS: Within normal limits.  KIDNEYS/URETERS: There is a 1 mm nonobstructing stone in the upper pole of the right kidney and a 3 mm stone in the upper pole of the left kidney. There is mild bilateral perinephric stranding. There is mild to moderate bilateral hydroureteronephrosis extending to the bladder without ureteral stone.    BLADDER: The bladder is markedly distended and contains excreted contrast. The bladder is trabeculated. The prostate is enlarged and invaginates the bladder base.  REPRODUCTIVE ORGANS:    BOWEL: Sigmoid diverticulosis.No bowel obstruction. Appendix is normal.  PERITONEUM: No ascites.  VESSELS: Within normal limits.  RETROPERITONEUM/LYMPH NODES: No lymphadenopathy.  ABDOMINAL WALL: Within normal limits.  BONES: Degenerative disc disease and spondylosis of the lowerlumbar spine.    IMPRESSION: Markedly distended bladder with trabeculation and enlarged prostate with mild to moderate bilateral hydroureteronephrosis, likely secondary to chronic bladder outlet obstruction. Recommend Avitia catheter placement. Otherwise, no evidence of malignancy in the abdomen and pelvis on this noncontrast CT.    < end of copied text >

## 2021-10-09 NOTE — PROGRESS NOTE ADULT - ASSESSMENT
60 y/o M PMHx significant for hypertension, and gout presents to  after reportedly being found to have an extensive right lower extremity DVT. CTA Chest => revealed bilateral pulmonary embolism involving the segmental and subsegmental branches of the bilateral lower lobes, right middle lobe, and signs of mild right heart strain.     1. Extensive Right Lower Extremity DVT and Bilateral Pulmonary Emboli with signs of right heart strain  -Stop Heparin gtt and transition to Eliquis; 10 mg BID x 7 days loading then 5 mg BID thereafter  -Echo ordered to evaluate degree of heart strain, done this morning pending read  -Vascular surgery consulted, follow recs  -CT ab/pel with PO contrast to evaluate for underlying malignancy (will not give contrast IV given IV contrast load for CTA < 24 hours ago)    2. Hypertension  -Continue Enalapril 20mg and Amlodipine 5mg daily    3.Gout   -Continue Allopurinol 100mg daily    VTE ppx: Eliquis started today  GOC: Full Code
62 y/o M PMHx significant for hypertension, and gout presents to  after reportedly being found to have an extensive right lower extremity DVT. CTA Chest => revealed bilateral pulmonary embolism involving the segmental and subsegmental branches of the bilateral lower lobes, right middle lobe, and signs of mild right heart strain.     * Extensive Right Lower Extremity DVT and Bilateral Pulmonary Emboli with signs of right heart strain   Eliquis; 10 mg BID x 7 days loading then 5 mg BID thereafter    * Hematuria  pt with urinary retention  started on Flomax    * Hypertension  -Continue Enalapril 20mg and Amlodipine 5mg daily    Plan dc in am if stable  csw

## 2021-10-09 NOTE — CONSULT NOTE ADULT - ASSESSMENT
rs, 3-4 hours, on and off cons, noc 1 x.   Contipation    Irrigated: no clots, light pink.  Continue Flomax.   If patient leaks around catheter, clots in drainage tube, catheter not draining or if Patient complains of abdominal pain. Manually irrigate with 60 ml NS until light pink fluid return with no clots. If continued issues may need 3 w ay vargas   Trial of void in 3 days, check PVR if significant reinsert Vargas.  Follow as out patient,   Irrigated: no clots, light pink.  Continue Flomax.   If patient leaks around catheter, clots in drainage tube, catheter not draining or if Patient complains of abdominal pain. Manually irrigate with 60 ml NS until light pink fluid return with no clots. If continued issues may need 3 w ay vargas   Trial of void in 3 days, check PVR if significant reinsert Vargas.  Hematuria probably secondary to catheter trauma and bladder decompression.   Follow as out patient.

## 2021-10-09 NOTE — PROGRESS NOTE ADULT - SUBJECTIVE AND OBJECTIVE BOX
CC: Right Lower Extremity Swelling (08 Oct 2021 02:12)    HPI:  60 y/o M PMHx significant for hypertension, and gout presents to  after reportedly being found to have an extensive right lower extremity DVT. The patient states that over the past week he has had c/o right lower extremity swelling; outpatient right lower extremity ultrasound revealed an extensive deep venous thrombosis extending from the common femoral vein, superficial femoral vein, popliteal to the calf veins. The patient denied any associated chest pain, palpitations, pleuritic pain, shortness of breath, recent travel, or prior history of blood clots (DVTs/PEs). CTA Chest => revealed bilateral pulmonary embolism involving the segmental and subsegmental branches of the bilateral lower lobes, right middle lobe, and signs of mild right heart strain.   (08 Oct 2021 02:12)    Overnight hematuria    Vital Signs Last 24 Hrs  T(C): 36.4 (09 Oct 2021 06:00), Max: 36.6 (08 Oct 2021 20:00)  T(F): 97.6 (09 Oct 2021 06:00), Max: 97.8 (08 Oct 2021 20:00)  HR: 83 (09 Oct 2021 12:00) (80 - 116)  BP: 144/76 (09 Oct 2021 12:00) (113/83 - 191/96)  BP(mean): 86 (09 Oct 2021 12:00) (84 - 118)  RR: 17 (09 Oct 2021 07:00) (17 - 18)  SpO2: 100% (09 Oct 2021 12:00) (95% - 100%)    PHYSICAL EXAM:  General: Age-appearing male in no acute distress  Eyes: EOMI; conjunctiva and sclera clear  Head: Normocephalic; atraumatic  Neck: Supple  Respiratory: No wheezes, rales or rhonchi  Cardiovascular: tachycardic, regular rhythm. S1 and S2 Normal; No murmurs, gallops or rubs  Gastrointestinal: Soft non-tender non-distended; Normal bowel sounds  Extremities: 1+ non-pitting edema in RLE, mildly tender  Vascular: Peripheral pulses palpable 2+ bilaterally  Neurological: Alert and oriented x4  Skin: Warm and dry. No acute rash  Musculoskeletal: No deformities  Psychiatric: Cooperative and appropriate                            13.4   8.52  )-----------( 232      ( 09 Oct 2021 06:38 )             38.3   10-09    136  |  105  |  15  ----------------------------<  109<H>  3.9   |  23  |  0.93    Ca    9.0      09 Oct 2021 06:38  Mg     2.0     10-07    TPro  7.1  /  Alb  4.0  /  TBili  0.9  /  DBili  x   /  AST  26  /  ALT  29  /  AlkPhos  75  10-08      CT Angio Chest PE Protocol w/ IV Cont   IMPRESSION:  Bilateral pulmonary emboli as above. Mild right heart strain.        
CC: Right Lower Extremity Swelling (08 Oct 2021 02:12)    HPI:  60 y/o M PMHx significant for hypertension, and gout presents to  after reportedly being found to have an extensive right lower extremity DVT. The patient states that over the past week he has had c/o right lower extremity swelling; outpatient right lower extremity ultrasound revealed an extensive deep venous thrombosis extending from the common femoral vein, superficial femoral vein, popliteal to the calf veins. The patient denied any associated chest pain, palpitations, pleuritic pain, shortness of breath, recent travel, or prior history of blood clots (DVTs/PEs). CTA Chest => revealed bilateral pulmonary embolism involving the segmental and subsegmental branches of the bilateral lower lobes, right middle lobe, and signs of mild right heart strain.   (08 Oct 2021 02:12)    INTERVAL HPI/OVERNIGHT EVENTS: Patient seen and examined bedside this morning, no acute overnight events. Patient denies chest pain, SOB. Minimal pain in the RLE. Patient again denies any provoking factors such as recent surgery, long travel. Denies known family history of clotting disorders. Per RN, patient does become very tachycardic with exertion.    Vital Signs Last 24 Hrs  T(C): 36.5 (08 Oct 2021 08:00), Max: 37 (08 Oct 2021 00:30)  T(F): 97.7 (08 Oct 2021 08:00), Max: 98.6 (08 Oct 2021 00:30)  HR: 78 (08 Oct 2021 06:00) (75 - 102)  BP: 145/94 (08 Oct 2021 06:00) (145/94 - 196/115)  BP(mean): 105 (08 Oct 2021 06:00) (92 - 116)  RR: 18 (08 Oct 2021 06:00) (11 - 18)  SpO2: 96% (08 Oct 2021 06:00) (94% - 100%)    PHYSICAL EXAM:  General: Age-appearing male in no acute distress  Eyes: EOMI; conjunctiva and sclera clear  Head: Normocephalic; atraumatic  Neck: Supple  Respiratory: No wheezes, rales or rhonchi  Cardiovascular: tachycardic, regular rhythm. S1 and S2 Normal; No murmurs, gallops or rubs  Gastrointestinal: Soft non-tender non-distended; Normal bowel sounds  Extremities: 1+ non-pitting edema in RLE, mildly tender  Vascular: Peripheral pulses palpable 2+ bilaterally  Neurological: Alert and oriented x4  Skin: Warm and dry. No acute rash  Musculoskeletal: No deformities  Psychiatric: Cooperative and appropriate    I&O's Detail                                12.8   5.88  )-----------( 216      ( 08 Oct 2021 07:15 )             36.7     08 Oct 2021 07:15    138    |  105    |  12     ----------------------------<  104    3.7     |  25     |  0.95     Ca    9.1        08 Oct 2021 07:15  Mg     2.0       07 Oct 2021 18:36    TPro  7.1    /  Alb  4.0    /  TBili  0.9    /  DBili  x      /  AST  26     /  ALT  29     /  AlkPhos  75     08 Oct 2021 07:15    PT/INR - ( 07 Oct 2021 18:36 )   PT: 12.7 sec;   INR: 1.10 ratio         PTT - ( 08 Oct 2021 10:18 )  PTT:72.5 sec  CAPILLARY BLOOD GLUCOSE        LIVER FUNCTIONS - ( 08 Oct 2021 07:15 )  Alb: 4.0 g/dL / Pro: 7.1 gm/dL / ALK PHOS: 75 U/L / ALT: 29 U/L / AST: 26 U/L / GGT: x               MEDICATIONS  (STANDING):  allopurinol 100 milliGRAM(s) Oral daily  amLODIPine   Tablet 5 milliGRAM(s) Oral daily  apixaban 10 milliGRAM(s) Oral every 12 hours  enalapril 20 milliGRAM(s) Oral daily  influenza   Vaccine 0.5 milliLiter(s) IntraMuscular once    MEDICATIONS  (PRN):  acetaminophen   Tablet .. 650 milliGRAM(s) Oral every 6 hours PRN Temp greater or equal to 38C (100.4F), Mild Pain (1 - 3)  ALPRAZolam 0.5 milliGRAM(s) Oral at bedtime PRN for insomnia  aluminum hydroxide/magnesium hydroxide/simethicone Suspension 30 milliLiter(s) Oral every 4 hours PRN Dyspepsia  melatonin 3 milliGRAM(s) Oral at bedtime PRN Insomnia  ondansetron Injectable 4 milliGRAM(s) IV Push every 8 hours PRN Nausea and/or Vomiting      RADIOLOGY & ADDITIONAL TESTS:    CT Angio Chest PE Protocol w/ IV Cont   IMPRESSION:  Bilateral pulmonary emboli as above. Mild right heart strain.

## 2021-10-10 ENCOUNTER — TRANSCRIPTION ENCOUNTER (OUTPATIENT)
Age: 61
End: 2021-10-10

## 2021-10-10 VITALS — HEART RATE: 89 BPM | SYSTOLIC BLOOD PRESSURE: 158 MMHG | DIASTOLIC BLOOD PRESSURE: 71 MMHG | OXYGEN SATURATION: 99 %

## 2021-10-10 DIAGNOSIS — N40.1 BENIGN PROSTATIC HYPERPLASIA WITH LOWER URINARY TRACT SYMPTOMS: ICD-10-CM

## 2021-10-10 DIAGNOSIS — R33.9 RETENTION OF URINE, UNSPECIFIED: ICD-10-CM

## 2021-10-10 DIAGNOSIS — R31.0 GROSS HEMATURIA: ICD-10-CM

## 2021-10-10 LAB
HCT VFR BLD CALC: 37.1 % — LOW (ref 39–50)
HGB BLD-MCNC: 12.8 G/DL — LOW (ref 13–17)
MCHC RBC-ENTMCNC: 33.9 PG — SIGNIFICANT CHANGE UP (ref 27–34)
MCHC RBC-ENTMCNC: 34.5 GM/DL — SIGNIFICANT CHANGE UP (ref 32–36)
MCV RBC AUTO: 98.1 FL — SIGNIFICANT CHANGE UP (ref 80–100)
PLATELET # BLD AUTO: 236 K/UL — SIGNIFICANT CHANGE UP (ref 150–400)
RBC # BLD: 3.78 M/UL — LOW (ref 4.2–5.8)
RBC # FLD: 13 % — SIGNIFICANT CHANGE UP (ref 10.3–14.5)
WBC # BLD: 6.54 K/UL — SIGNIFICANT CHANGE UP (ref 3.8–10.5)
WBC # FLD AUTO: 6.54 K/UL — SIGNIFICANT CHANGE UP (ref 3.8–10.5)

## 2021-10-10 PROCEDURE — 99239 HOSP IP/OBS DSCHRG MGMT >30: CPT

## 2021-10-10 RX ORDER — BNT162B2 0.23 MG/2.25ML
0.3 INJECTION, SUSPENSION INTRAMUSCULAR
Qty: 0 | Refills: 0 | DISCHARGE

## 2021-10-10 RX ORDER — APIXABAN 2.5 MG/1
2 TABLET, FILM COATED ORAL
Qty: 4 | Refills: 0
Start: 2021-10-10 | End: 2021-10-10

## 2021-10-10 RX ORDER — IBUPROFEN 200 MG
2 TABLET ORAL
Qty: 0 | Refills: 0 | DISCHARGE

## 2021-10-10 RX ORDER — TAMSULOSIN HYDROCHLORIDE 0.4 MG/1
1 CAPSULE ORAL
Qty: 30 | Refills: 0
Start: 2021-10-10 | End: 2021-11-08

## 2021-10-10 RX ORDER — MILK THISTLE 180 MG
1 CAPSULE ORAL
Qty: 0 | Refills: 0 | DISCHARGE

## 2021-10-10 RX ADMIN — Medication 100 MILLIGRAM(S): at 09:46

## 2021-10-10 RX ADMIN — AMLODIPINE BESYLATE 5 MILLIGRAM(S): 2.5 TABLET ORAL at 09:46

## 2021-10-10 RX ADMIN — APIXABAN 10 MILLIGRAM(S): 2.5 TABLET, FILM COATED ORAL at 09:46

## 2021-10-10 NOTE — DISCHARGE NOTE PROVIDER - HOSPITAL COURSE
60 y/o M PMHx significant for hypertension, and gout presents to  after reportedly being found to have an extensive right lower extremity DVT. The patient states that over the past week he has had c/o right lower extremity swelling; outpatient right lower extremity ultrasound revealed an extensive deep venous thrombosis extending from the common femoral vein, superficial femoral vein, popliteal to the calf veins. The patient denied any associated chest pain, palpitations, pleuritic pain, shortness of breath, recent travel, or prior history of blood clots (DVTs/PEs). CTA Chest => revealed bilateral pulmonary embolism involving the segmental and subsegmental branches of the bilateral lower lobes, right middle lobe, and signs of mild right heart strain.   (08 Oct 2021 02:12)    c/w intermittent hematuria and at times clear urine; straining causes hematuria         PHYSICAL EXAM:  General: Age-appearing male in no acute distress  Eyes: EOMI; conjunctiva and sclera clear  Head: Normocephalic; atraumatic  Neck: Supple  Respiratory: No wheezes, rales or rhonchi  Cardiovascular: tachycardic, regular rhythm. S1 and S2 Normal; No murmurs, gallops or rubs  Gastrointestinal: Soft non-tender non-distended; Normal bowel sounds  Extremities: 1+ non-pitting edema in RLE, mildly tender  Vascular: Peripheral pulses palpable 2+ bilaterally  Neurological: Alert and oriented x4  Skin: Warm and dry. No acute rash  Musculoskeletal: No deformities  Psychiatric: Cooperative and appropriate                            13.4   8.52  )-----------( 232      ( 09 Oct 2021 06:38 )             38.3   10-09    136  |  105  |  15  ----------------------------<  109<H>  3.9   |  23  |  0.93    Ca    9.0      09 Oct 2021 06:38  Mg     2.0     10-07    TPro  7.1  /  Alb  4.0  /  TBili  0.9  /  DBili  x   /  AST  26  /  ALT  29  /  AlkPhos  75  10-08      CT Angio Chest PE Protocol w/ IV Cont   IMPRESSION:  Bilateral pulmonary emboli as above. Mild right heart strain.            Assessment and Plan:   · Assessment	  60 y/o M PMHx significant for hypertension, and gout presents to  after reportedly being found to have an extensive right lower extremity DVT. CTA Chest => revealed bilateral pulmonary embolism involving the segmental and subsegmental branches of the bilateral lower lobes, right middle lobe, and signs of mild right heart strain.     * Extensive Right Lower Extremity DVT and Bilateral Pulmonary Emboli with signs of right heart strain   Eliquis; 10 mg BID x 7 days loading then 5 mg BID thereafter    * Hematuria  pt with urinary retention  started on Flomax    * Hypertension  -Continue Enalapril 20mg and Amlodipine 5mg daily    Dr Hare aware, left message with [facility], doc will be back there on Fri but he aware pt was in with PE

## 2021-10-10 NOTE — DISCHARGE NOTE PROVIDER - NSDCHC_MEDRECSTATUS_GEN_ALL_CORE
NEONATOLOGY DAILY PROGRESS NOTE      Subjective     Twin Iron Caldwell is a 2 Wks old former Gestational Age: 31w1d, date of Birth 2023, birthweight 1515 g male infant admitted 2021 12:47 PM and is a   baby.    Corrected Gestational Age: 33w6d      Twin Iron Caldwell requires  Intensive Care for Prematurity. With the need for continuous cardiorespiratory monitoring, monitoring of feeding tolerance, and temperature control.    Overnight Events  Stable OVN      Objective     Vital signs reviewed  Visit Vitals  BP 71/38 (BP Location: RLE - Right lower extremity)   Pulse 158   Temp 99 °F (37.2 °C) (Axillary)   Resp 50   Ht 17.32\" (44 cm)   Wt (!) 1628 g   HC 30.5 cm (12.01\")   SpO2 98%   BMI 8.41 kg/m²        Current Weight (!) 1628 g (21 0000)   Most recent weights:  Weight    21 0000 21 0000 21 0000 21 0000   Weight: (!) 1580 g (!) 1580 g (!) 1630 g (!) 1628 g     Weight Change Since yesterday: Weight change: -2 g     Weight Change Since Birth: 7%       Apnea/Bradycardia/Desaturation in last 24 hours    No data found.        Lines,Tube Drains    Intubation  Necessity/Indication: Not Intubated  Readiness for Extubation discussed - N/A 2021    Central Line  Type of Central Line: UVC 5/15 -   Necessity/Indication: No Central line in place  Need for Central Line discussed YES 2021.      Fluids  Based off a Dosing Weight: 1570 g    Last 24H:     Intake/Output        0700 -  0659  0700 -  0659    P.O. (mL/kg) 35 (21.5)     NG/GT (mL/kg) 221 (135.75)     Total Intake(mL/kg) 256 (157.25)     Urine (mL/kg/hr) 108 (2.76)     Stool 0     Total Output(mL/kg) 108 (66.34)     Net +148           Urine Occurrence 5 x     Stool Occurrence 2 x           Urine: Urine Occurrence  Min: 1  Max: 1 Last Stool: 1 (21 1145)    Labs (Last 24 hours)  No  results found for this or any previous visit (from the past 24 hour(s)).       LABS REVIEWED    Medications  Current Facility-Administered Medications   Medication   • pediatric multivitamin with iron (POLY-VI-SOL WITH IRON) oral solution 0.5 mL   • cholecalciferol (VITAMIN D) 5 mcg (200 units)/0.5 mL oral liquid 5 mcg          Vitals    24 Hour Range   Temperature   Temp  Min: 98.1 °F (36.7 °C)  Max: 99 °F (37.2 °C)   Pulse   Pulse  Min: 142  Max: 197   Respiratory   Resp  Min: 26  Max: 73   Blood Pressure   BP  Min: 53/33  Max: 75/40   Pulse Oximetry    SpO2  Min: 96 %  Max: 100 %       Physical Exam    General:  No acute distress.    Eye:  Pupils are equal, round and reactive to light, Red reflex (deferred).    HENT:  Normocephalic, Anterior fontanelle open/soft/flat, Ears normally set and rotated, Palate intact, nondysmorphic.    Neck:  No thyromegaly, clavicles intact.    Respiratory:  Lungs are clear to auscultation, Respirations are non-labored, Breath sounds are equal.    Cardiovascular:  Normal rate, Regular rhythm, Normal peripheral perfusion.    Gastrointestinal:  Soft, Non-distended, No organomegaly, Anus patent.    Genitourinary:  Normal genitalia for age and sex.    Musculoskeletal     No deformity.     Integumentary:  Pink, No pallor, No rash, no abornormal nevi or hemangiomas noted   Neurologic:  Normal deep tendon reflexes, No focal deficits, normal tone for gestational age.        Assessment & Plan  by system     Former Gestational Age: 31w1d male infant, now corrected to 33w6d        Fluids, Electrolytes and Nutrition:  Assessment:  infant  Serum Electrolytes - Normal.    - NG/OG: YES  - TF: 157 ml/kg/day  - PO% - 14%  - Feeds - BM24 32 mL q 3 hrs 24 grey/oz     Plan:  - increase feeds to 31cPv5wff NG over 30 minutes  - Gaol for TFI of 160 mL/kg/dy.    Respiratory System:  Assessment: Respiratory Distress Syndrome (RDS)    - Surfactant: Not given   - CPAP from  to   - HFNC  -  5/24    Plan:  - Wean to RA    Apnea/Bradycardia/Desaturations:  Assessment:  Apnea of Prematurity    - Last Apnea:     - Intervention: Tactile stimulation, mild (05/31/21 2134)  - Last Desaturation: Event SpO2: 64 (05/23/21 2118)  Desaturation (secs): 10 secs (05/23/21 2118)  - Intervention: Intervention: Tactile stimulation, mild (05/31/21 2134)    - Last Bradycardia: 20 secs (05/31/21 2134);  - Interventions: Intervention: Tactile stimulation, mild (05/31/21 2134)    - Activity Prior to Event: Feeding    - Caffeine Loading dose 20mg/kg/dose - Yes - Date: 5/15  DC Caffiene 5/30  - Caffeine maintenance 10mg/kg qday - Yes  - 5/30 DC Caffiene    Plan:  - - Continuous cardiorespiratory monitoring  - Monitor clinically for spells     Gastrointestinal System:  Assessment: None    Plan:  - Monitor for feeding tolerance.      Hematology:  Assessment: At risk for Anemia of Prematurity and At risk for Hyperbilirubinemia     Baby's blood type is O Rh Positive;  Jonah: Negative  Maternal blood type is   Information for the patient's mother:  CaldwellLoni [[id number]]   O Rh Positive     Last Bilirubin:   No results found   Last Hg:   HGB (g/dL)   Date Value   2021 10.8 (L)     Number of PRBC transfusions: 0  Most Recent transfusion:   Phototherapy: 5/18 - 5/19    Plan:  - Type and screen on admission: Yes    Infectious Disease:  Assessment: No concerns for infection at this time    Received 36 hours of empiric antibiotics at birth.  Blood culture negative  Maternal Hx Covid  - Test baby for covid  On 5/16, Negative, and 5/19 negative    Neurology:  - HUS ordered due to 31 week preemie multiple gestation  - 5/27 HUS: Lateral cerebral ventricles are normal and symmetric in size and morphology. There is no sonographic evidence for subependymal, intraventricular or intraparenchymal hemorrhage. Resistive index of pericallosal arteries is approximately 0.86.    Therapies:   PT/OT consults per unit guidelines             Screenings & Procedures   Immunizations:   There is no immunization history on file for this patient.   Hearing Test:    [place] ROP Eye Exam Needed?: Yes (21 0200) Yes  Car Seat Screen:    CCHD Screening:   Screening complete:    Right hand reading %:    Foot reading %:    CHD:    Circumcision:    [place] Screen- date drawn (most recent results): 21 (21 0700)  Last 3 results: 21 (21 0700)  Synagis Candidate: No (21 1500)      Parents plan to follow-up as an outpatient following discharge home with - TBD  Update Parents routinely.  I have spoken with the nursing staff and the healthcare team and reviewed findings and plan of management.      Updated Problem List under \" Problem List \" section - YES 2021   Active Problems:    Prematurity, 1,500-1,749 grams, 31-32 completed weeks    Twin birth delivered by  section in hospital  Resolved Problems:    Jaundice of     RDS (respiratory distress syndrome in the )    Probable sepsis     hypermagnesemia    Exposure to COVID-19 virus     hypocalcemia    Hypernatremia of      Admission Reconciliation is Not Complete  Discharge Reconciliation is Completed

## 2021-10-10 NOTE — DISCHARGE NOTE PROVIDER - PROVIDER TOKENS
PROVIDER:[TOKEN:[[id number]],FOLLOWUP:[1 week]],PROVIDER:[TOKEN:[[id number]],FOLLOWUP:[1 week]],PROVIDER:[TOKEN:[[id number]],FOLLOWUP:[1-3 days]]

## 2021-10-10 NOTE — DISCHARGE NOTE PROVIDER - NSDCMRMEDTOKEN_GEN_ALL_CORE_FT
allopurinol 100 mg oral tablet: 1 tab(s) orally once a day  ALPRAZolam 0.5 mg oral tablet: 1 tab(s) orally once a day (at bedtime), As Needed - for insomnia  amLODIPine 5 mg oral tablet: 1 tab(s) orally once a day  Eliquis 5 mg oral tablet: 1 tab(s) orally 2 times a day  after you finish the 10 mg twice a day regimen  Eliquis 5 mg oral tablet: 2 tab(s) orally 2 times a day   enalapril 20 mg oral tablet: 1 tab(s) orally once a day  Multiple Vitamins oral tablet: 1 tab(s) orally once a day  tamsulosin 0.4 mg oral capsule: 1 cap(s) orally once a day (at bedtime)

## 2021-10-10 NOTE — DISCHARGE NOTE PROVIDER - NSDCCPCAREPLAN_GEN_ALL_CORE_FT
PRINCIPAL DISCHARGE DIAGNOSIS  Diagnosis: Multiple pulmonary emboli  Assessment and Plan of Treatment: blood thinner as expalined; see Hematology Dr Cano as outpt; if bleeding is worse ( darker) or if clotted come back to ED      SECONDARY DISCHARGE DIAGNOSES  Diagnosis: Leg swelling  Assessment and Plan of Treatment:     Diagnosis: DVT, lower extremity  Assessment and Plan of Treatment:

## 2021-10-10 NOTE — DISCHARGE NOTE NURSING/CASE MANAGEMENT/SOCIAL WORK - PATIENT PORTAL LINK FT
You can access the FollowMyHealth Patient Portal offered by [facility] by registering at the following website: [email or web address]. By joining [employer]’s FollowMyHealth portal, you will also be able to view your health information using other applications (apps) compatible with our system.

## 2021-10-10 NOTE — DISCHARGE NOTE PROVIDER - CARE PROVIDER_API CALL
Simba Hare)  Internal Medicine  [address] [place] [address]  Phone: [phone]  Fax: [phone]  Follow Up Time: 1 week    Jaswinder Cano  HEMATOLOGY  [address] [place] [address], [address] [place] [address]  Phone: [phone]  Fax: [phone]  Follow Up Time: 1 week    Tani Gonzales)  Urology  [address] [place] [address] [address] [place] [address]  Phone: [phone]  Fax: [phone]  Follow Up Time: 1-3 days

## 2021-10-10 NOTE — DISCHARGE NOTE PROVIDER - CARE PROVIDERS DIRECT ADDRESSES
,DirectAddress_Unknown,DirectAddress_Unknown,[email or web address]

## 2021-10-11 PROBLEM — M10.9 GOUT, UNSPECIFIED: Chronic | Status: ACTIVE | Noted: 2021-10-08

## 2021-10-12 ENCOUNTER — APPOINTMENT (OUTPATIENT)
Dept: RHEUMATOLOGY | Facility: CLINIC | Age: 61
End: 2021-10-12

## 2021-10-13 ENCOUNTER — APPOINTMENT (OUTPATIENT)
Dept: UROLOGY | Facility: CLINIC | Age: 61
End: 2021-10-13
Payer: MEDICAID

## 2021-10-13 VITALS
OXYGEN SATURATION: 98 % | SYSTOLIC BLOOD PRESSURE: 145 MMHG | DIASTOLIC BLOOD PRESSURE: 70 MMHG | TEMPERATURE: 97.2 F | HEART RATE: 82 BPM

## 2021-10-13 DIAGNOSIS — Z63.5 DISRUPTION OF FAMILY BY SEPARATION AND DIVORCE: ICD-10-CM

## 2021-10-13 DIAGNOSIS — N20.0 CALCULUS OF KIDNEY: ICD-10-CM

## 2021-10-13 DIAGNOSIS — Z86.79 PERSONAL HISTORY OF OTHER DISEASES OF THE CIRCULATORY SYSTEM: ICD-10-CM

## 2021-10-13 DIAGNOSIS — Z86.718 PERSONAL HISTORY OF OTHER VENOUS THROMBOSIS AND EMBOLISM: ICD-10-CM

## 2021-10-13 PROCEDURE — 51798 US URINE CAPACITY MEASURE: CPT

## 2021-10-13 PROCEDURE — 99214 OFFICE O/P EST MOD 30 MIN: CPT | Mod: 25

## 2021-10-13 PROCEDURE — 51702 INSERT TEMP BLADDER CATH: CPT

## 2021-10-13 SDOH — SOCIAL STABILITY - SOCIAL INSECURITY: DISRUPTION OF FAMILY BY SEPARATION AND DIVORCE: Z63.5

## 2021-10-13 NOTE — PHYSICAL EXAM
[Normal Appearance] : normal appearance [General Appearance - In No Acute Distress] : no acute distress [] : no respiratory distress [FreeTextEntry1] : Avitia to leg bag draining blood tinged urine  [Normal Station and Gait] : the gait and station were normal for the patient's age [Skin Color & Pigmentation] : normal skin color and pigmentation [No Focal Deficits] : no focal deficits [Oriented To Time, Place, And Person] : oriented to person, place, and time

## 2021-10-13 NOTE — ASSESSMENT
[FreeTextEntry1] : Kidney stone:\par 1 mm right and 3 mm left non obstructing kidney stones. \par No active intervention at this point. \par Discussed kidney stone prevention diet. \par \par Benign Prostatic Hyperplasia:\par Urinary retention:\par Gross hematuria:\par Avitia removed in the morning. \par Drank 30 ounces of water. Came back after 5 hours, no urge to urinate and could not urinate. \par PVR: 613 ml. \par Discussed treatment options mainly: continued medical management with alpha blocker and or 5 alpha reductase inhibitor Vs Clean intermittent catheterization/Indwelling Avitia catheter Vs Surgery: Transurethral resection/vaporization/ablation of Prostate and Simple prostatectomy: open Vs robotic after appropriate work up.\par Also discussed emerging minimally invasive surgical therapies: Prostatic urethral lift (Urolift) and Water vapor thermal therapy (Rezum). \par Discussed risks and benefits of each. \par Recent DVT of lower extremity on Eliquis. Will be seeing Hematologist, will discuss anticoagulation. \par Will prefer indwelling Avitia catheter: placed. \par Will maximize medical therapy: Change Flomax to Silodosin and will start Finasteride. \par Discussed side effect profile. \par \par Return to office in 4 weeks or sooner if any issues: trial of void. \par \par \par \par \par

## 2021-10-13 NOTE — HISTORY OF PRESENT ILLNESS
[Urinary Retention] : urinary retention [FreeTextEntry1] : 61 year old male presents for follow up. \par Avitia to leg bag draining blood tinged urine. \par Taking Flomax. \par Seen as consult at [facility] for distended bladder with bilateral hydronephrosis on CT scan. \par Avitia catheter was placed, developed gross hematuria after Avitia. Drained 1500 ml. \par Had presented to [facility] with lower extremity deep vein thrombosis. On Eliquis. \par \par Before admission would urinate with variable stream, every 1-2 hours or so during the day and nocturia 0-1 x. \par Endorses off and on hesitancy, straining, intermittency and sense of incomplete emptying.\par \par No family history of Prostate cancer. \par \par Past occasional smoker: 2-3 cigarettes a day for 10 years. Quit 10 years ago. \par \par

## 2021-10-15 DIAGNOSIS — I82.401 ACUTE EMBOLISM AND THROMBOSIS OF UNSPECIFIED DEEP VEINS OF RIGHT LOWER EXTREMITY: ICD-10-CM

## 2021-10-15 DIAGNOSIS — S37.20XA UNSPECIFIED INJURY OF BLADDER, INITIAL ENCOUNTER: ICD-10-CM

## 2021-10-15 DIAGNOSIS — R31.0 GROSS HEMATURIA: ICD-10-CM

## 2021-10-15 DIAGNOSIS — I26.99 OTHER PULMONARY EMBOLISM WITHOUT ACUTE COR PULMONALE: ICD-10-CM

## 2021-10-15 DIAGNOSIS — R33.8 OTHER RETENTION OF URINE: ICD-10-CM

## 2021-10-15 DIAGNOSIS — N99.61 INTRAOPERATIVE HEMORRHAGE AND HEMATOMA OF A GENITOURINARY SYSTEM ORGAN OR STRUCTURE COMPLICATING A GENITOURINARY SYSTEM PROCEDURE: ICD-10-CM

## 2021-10-15 DIAGNOSIS — N40.1 BENIGN PROSTATIC HYPERPLASIA WITH LOWER URINARY TRACT SYMPTOMS: ICD-10-CM
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
## 2021-10-15 DIAGNOSIS — N13.8 OTHER OBSTRUCTIVE AND REFLUX UROPATHY: ICD-10-CM

## 2021-10-28 ENCOUNTER — NON-APPOINTMENT (OUTPATIENT)
Age: 61
End: 2021-10-28

## 2021-10-29 ENCOUNTER — APPOINTMENT (OUTPATIENT)
Dept: UROLOGY | Facility: CLINIC | Age: 61
End: 2021-10-29
Payer: MEDICAID

## 2021-10-29 PROCEDURE — 51700 IRRIGATION OF BLADDER: CPT

## 2021-10-29 PROCEDURE — A4218: CPT | Mod: NC

## 2021-11-02 ENCOUNTER — NON-APPOINTMENT (OUTPATIENT)
Age: 61
End: 2021-11-02

## 2021-11-10 ENCOUNTER — APPOINTMENT (OUTPATIENT)
Dept: UROLOGY | Facility: CLINIC | Age: 61
End: 2021-11-10
Payer: MEDICAID

## 2021-11-10 VITALS
DIASTOLIC BLOOD PRESSURE: 80 MMHG | BODY MASS INDEX: 26.51 KG/M2 | HEIGHT: 73 IN | HEART RATE: 100 BPM | WEIGHT: 200 LBS | TEMPERATURE: 97.5 F | OXYGEN SATURATION: 97 % | SYSTOLIC BLOOD PRESSURE: 156 MMHG

## 2021-11-10 PROCEDURE — 51798 US URINE CAPACITY MEASURE: CPT

## 2021-11-10 PROCEDURE — 51702 INSERT TEMP BLADDER CATH: CPT

## 2021-11-10 PROCEDURE — 99213 OFFICE O/P EST LOW 20 MIN: CPT | Mod: 25

## 2021-11-10 NOTE — ASSESSMENT
[FreeTextEntry1] : Benign Prostatic Hyperplasia:\par Urinary retention:\par Avitia removed in the morning. Was not able to urinate. \par PVR: > 800 ml. \par Discussed treatment options. \par Wanted Avitia reinserted. Cloudy urine. \par Continue Silodosin and Finasteride. \par Will get Urinalysis and Urine culture \par Start Cefuroxime. \par \par Asked to consider clean intermittent catheterizations instead of indwelling Avitia catheter. Will consider. \par Return to office in 2 weeks or sooner if any issues.

## 2021-11-10 NOTE — HISTORY OF PRESENT ILLNESS
[FreeTextEntry1] : 61 year old male presents for follow up. \par Taking Silodosin and Finasteride. \par Avitia to leg bag draining clear urine. \par Had to come in between for catheter issues and blood in urine. \par \par Failed trial of void at last visit. \par \par Seen as consult at [facility] for distended bladder with bilateral hydronephrosis on CT scan. \par Avitia catheter was placed, developed gross hematuria after Avitia. Drained 1500 ml. \par Had presented to [facility] with lower extremity deep vein thrombosis. On Eliquis. \par \par Before admission would urinate with variable stream, every 1-2 hours or so during the day and nocturia 0-1 x. \par Endorses off and on hesitancy, straining, intermittency and sense of incomplete emptying.\par \par No family history of Prostate cancer. \par \par Past occasional smoker: 2-3 cigarettes a day for 10 years. Quit 10 years ago. \par \par

## 2021-11-11 LAB
APPEARANCE: ABNORMAL
BACTERIA: ABNORMAL
BILIRUBIN URINE: NEGATIVE
BLOOD URINE: ABNORMAL
COLOR: YELLOW
GLUCOSE QUALITATIVE U: NEGATIVE
HYALINE CASTS: 0 /LPF
KETONES URINE: NEGATIVE
LEUKOCYTE ESTERASE URINE: ABNORMAL
MICROSCOPIC-UA: NORMAL
NITRITE URINE: NEGATIVE
PH URINE: 6.5
PROTEIN URINE: ABNORMAL
RED BLOOD CELLS URINE: 22 /HPF
SPECIFIC GRAVITY URINE: 1.01
SQUAMOUS EPITHELIAL CELLS: 0 /HPF
UROBILINOGEN URINE: NORMAL
WHITE BLOOD CELLS URINE: >720 /HPF

## 2021-11-15 LAB — BACTERIA UR CULT: ABNORMAL

## 2021-11-24 ENCOUNTER — TRANSCRIPTION ENCOUNTER (OUTPATIENT)
Age: 61
End: 2021-11-24

## 2021-11-26 ENCOUNTER — NON-APPOINTMENT (OUTPATIENT)
Age: 61
End: 2021-11-26

## 2021-11-29 ENCOUNTER — APPOINTMENT (OUTPATIENT)
Dept: UROLOGY | Facility: CLINIC | Age: 61
End: 2021-11-29
Payer: MEDICAID

## 2021-11-29 PROCEDURE — 51703 INSERT BLADDER CATH COMPLEX: CPT

## 2021-12-01 ENCOUNTER — APPOINTMENT (OUTPATIENT)
Dept: UROLOGY | Facility: CLINIC | Age: 61
End: 2021-12-01
Payer: MEDICAID

## 2021-12-01 PROCEDURE — 99214 OFFICE O/P EST MOD 30 MIN: CPT

## 2021-12-01 RX ORDER — TAMSULOSIN HYDROCHLORIDE 0.4 MG/1
0.4 CAPSULE ORAL
Qty: 30 | Refills: 0 | Status: DISCONTINUED | COMMUNITY
Start: 2021-10-10 | End: 2021-12-01

## 2021-12-01 NOTE — HISTORY OF PRESENT ILLNESS
[FreeTextEntry1] : 61 year old male presents for gross hematuria. \par Earlier this week developed gross hematuria, came in 2 days ago, required 3 way catheter and irrigation. \par Started on Antibiotics. Clear now. \par Taking Finasteride and Silodosin. 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 \par Will be seeing Pulmonologist. \par \par Failed trial of void in the past. \par \par Seen as consult at [facility] for distended bladder with bilateral hydronephrosis on CT scan. \par Avitia catheter was placed, developed gross hematuria after Avitia. Drained 1500 ml. \par Had presented to [facility] with lower extremity deep vein thrombosis. On Eliquis. \par \par Before admission would urinate with variable stream, every 1-2 hours or so during the day and nocturia 0-1 x. \par Endorses off and on hesitancy, straining, intermittency and sense of incomplete emptying.\par \par No family history of Prostate cancer. \par \par Past occasional smoker: 2-3 cigarettes a day for 10 years. Quit 10 years ago. \par \par

## 2021-12-01 NOTE — ASSESSMENT
[FreeTextEntry1] : Gross hematuria:\par Benign Prostatic Hyperplasia:\par Discussed gross hematuria possibly secondary to urinary tract infection Vs catheter trauma. \par Discussed considering anticoagulation treatment options of Transurethral vaporization of prostate using green light laser Vs Holmium laser enucleation of prostate. Discussed risks and benefits of each. \par Asked to see Dr Nolasco for consideration of HOLEP. \par Continue Silodosin and Finasteride. \par \par Return to office next week trial of void.

## 2021-12-08 ENCOUNTER — APPOINTMENT (OUTPATIENT)
Dept: UROLOGY | Facility: CLINIC | Age: 61
End: 2021-12-08
Payer: MEDICAID

## 2021-12-08 PROCEDURE — A4216: CPT | Mod: NC

## 2021-12-08 PROCEDURE — 51700 IRRIGATION OF BLADDER: CPT

## 2021-12-13 ENCOUNTER — APPOINTMENT (OUTPATIENT)
Dept: UROLOGY | Facility: CLINIC | Age: 61
End: 2021-12-13
Payer: MEDICAID

## 2021-12-13 VITALS
DIASTOLIC BLOOD PRESSURE: 76 MMHG | SYSTOLIC BLOOD PRESSURE: 118 MMHG | BODY MASS INDEX: 25.18 KG/M2 | OXYGEN SATURATION: 99 % | HEIGHT: 73 IN | WEIGHT: 190 LBS | HEART RATE: 82 BPM

## 2021-12-13 PROCEDURE — 99214 OFFICE O/P EST MOD 30 MIN: CPT

## 2021-12-13 NOTE — ASSESSMENT
[FreeTextEntry1] : \par plan:\par - will schedule appointment in 1 months to discuss recommendations and timing of surgery

## 2021-12-13 NOTE — HISTORY OF PRESENT ILLNESS
[FreeTextEntry1] : 60 yo M referred for consideration of laser enucleation of prostate\par see previous notes with Dr. 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 when is it safe to stop eliquis\par 2. for how long\par \par discussed voiding volumes and the need for more CIC and reduced fluid after evening hours\par recommended to target volumes not larger than 400 ml\par \par discussed laser enucleation of the prostate\par Discussed Laser enucleation of prostate with morcellation (HOLEP/THuLEP). Procedure, in hospital stay and recovery explained.\par \par Indications, options including chronic vargas catheter, suprapubic catheter, intermittent self-catheterization, transurethral resection of prostate, transurethral vaporization of prostate with laser or electrocautery, open or laparoscopic enucleation of the prostate, all discussed.\par \par Potential complications of transurethral holmium or thulium laser enucleation of prostate with morcellation discussed. This included risk of infection, bleeding, transfusion, conversion to open enucleation, need for staged procedure, adjacent organ injury, bladder injury, clot retention of urine requiring return to the operating room for cystoscopy clot evacuation, prolonged duration of vargas catheterization, urethral stricture, transient or permanent urinary incontinence,  retrograde ejaculation as a permanent and irreversible complication, redo or staged  surgery due to equipment malfunction, anesthetic complications, and cardiac complications all discussed.\par \par Printed information including all of the above and more uncommon complications provided to the patient. \par \par plan:\par - will schedule appointment in 1 months to discuss recommendations and timing of surgery\par

## 2021-12-14 ENCOUNTER — NON-APPOINTMENT (OUTPATIENT)
Age: 61
End: 2021-12-14

## 2021-12-14 ENCOUNTER — APPOINTMENT (OUTPATIENT)
Dept: INTERNAL MEDICINE | Facility: CLINIC | Age: 61
End: 2021-12-14
Payer: MEDICAID

## 2021-12-14 VITALS
HEIGHT: 73 IN | RESPIRATION RATE: 16 BRPM | DIASTOLIC BLOOD PRESSURE: 86 MMHG | OXYGEN SATURATION: 99 % | TEMPERATURE: 97.9 F | HEART RATE: 99 BPM | BODY MASS INDEX: 25.45 KG/M2 | WEIGHT: 192 LBS | SYSTOLIC BLOOD PRESSURE: 124 MMHG

## 2021-12-14 PROCEDURE — 94010 BREATHING CAPACITY TEST: CPT

## 2021-12-14 PROCEDURE — 99205 OFFICE O/P NEW HI 60 MIN: CPT | Mod: 25

## 2021-12-14 NOTE — PROCEDURE
[FreeTextEntry1] : Spirometry pre-and postbronchodilator therapy shows normal flow rates. FEV1 is 4.50 L which is 116% predicted. FEV1 percent is 88%.

## 2021-12-14 NOTE — HISTORY OF PRESENT ILLNESS
[TextBox_4] : Mr. Fleming is a 61-year-old male presents for initial pulmonary evaluation. Mr. Fleming was sent for ultrasound of the right lower extremity on 10/7/21 after presenting to his primary care doctor complaining of right leg swelling. Ultrasound showed DVT involving common femoral, peroneal and popliteal veins to the knee. Patient was then sent to [facility] emergency room and had a CT angiogram of the chest which demonstrated bilateral, segmental pulmonary emboli involving both lower lobes and the right middle lobe. Mr. Fleming was asymptomatic. He had no chest pain, shortness of breath or palpitations. There was no evidence of right ventricular strain on CAT scannd was found to have factor V Leiden deficiency. Patient also had significant urinary retention. A CT scan of the abdomen and pelvis showed a markedly distended bladder. He was seen by urology and had a Aviita catheter which was removed prior to discharge. Patient has been self catheterizing at home. He had a residual urine by them of 1500 cc. Mr. Fleming has no previous history of thromboembolic disease. There is no prior family history of blood clots. patient has a 10-20 pack year smoking history but stopped smoking cigarettes 10 years ago.

## 2021-12-14 NOTE — DISCUSSION/SUMMARY
[FreeTextEntry1] : Mr. Fleming presents for initial pulmonary evaluation. He is a 61-year-old male who presented to the hospital on 10/7/21 with bilateral pulmonary emboli and right lower extremity DVT. Hematology workup demonstrated factor V Leiden deficiency. Patient is currently on Eliquis 5 mg p.o. b.i.d. He will require lifelong anticoagulation. Mr. Fleming also has urinary retention secondary to prostatic hypertrophy. He is currently self catheterizing and will most likely require surgery. Patient will need to have an IVC filter placed before surgical procedure since she is at high risk for venous thromboembolic disease from being positive for factor V Leiden deficiency. This will be coordinated with urology and hematology. Patient will followup in this office in 3 months.

## 2021-12-21 ENCOUNTER — APPOINTMENT (OUTPATIENT)
Dept: RHEUMATOLOGY | Facility: CLINIC | Age: 61
End: 2021-12-21

## 2022-01-10 ENCOUNTER — NON-APPOINTMENT (OUTPATIENT)
Age: 62
End: 2022-01-10

## 2022-01-10 ENCOUNTER — APPOINTMENT (OUTPATIENT)
Dept: UROLOGY | Facility: CLINIC | Age: 62
End: 2022-01-10
Payer: MEDICAID

## 2022-01-10 PROCEDURE — 99215 OFFICE O/P EST HI 40 MIN: CPT

## 2022-01-11 NOTE — HISTORY OF PRESENT ILLNESS
[FreeTextEntry1] : 62 yo M for follow up\par see full notes from previous visit\par recently diagnosed factor V leiden\par \par patient is still performing CIC and feeling well\par saw his pulmonologist 12/14/2021 - see full notes\par was recommended insertion of IVC filter prior the procedure\par \par discussed treatment with pulmonologist yesterday\par 1. can stop eliquis 3 days prior to surgery and restart as soon as possible\par 2. will need IVC filter preferably in the same setting, even the same day as surgery\par 3. IVC filter will be removed 4-6 weeks after\par \par regarding the question of timing, think it is already safe to schedule the surgery starting next month but will need to see the patient to re-evaluate.\par \par patient informed and will schedule an appointment\par once he is cleared for surgery will start scheduling

## 2022-01-12 ENCOUNTER — APPOINTMENT (OUTPATIENT)
Dept: RHEUMATOLOGY | Facility: CLINIC | Age: 62
End: 2022-01-12

## 2022-01-25 ENCOUNTER — APPOINTMENT (OUTPATIENT)
Dept: INTERNAL MEDICINE | Facility: CLINIC | Age: 62
End: 2022-01-25
Payer: MEDICAID

## 2022-01-25 ENCOUNTER — NON-APPOINTMENT (OUTPATIENT)
Age: 62
End: 2022-01-25

## 2022-01-25 VITALS
DIASTOLIC BLOOD PRESSURE: 82 MMHG | HEIGHT: 73 IN | OXYGEN SATURATION: 99 % | RESPIRATION RATE: 16 BRPM | TEMPERATURE: 98 F | HEART RATE: 68 BPM | WEIGHT: 198 LBS | SYSTOLIC BLOOD PRESSURE: 124 MMHG | BODY MASS INDEX: 26.24 KG/M2

## 2022-01-25 DIAGNOSIS — Z01.811 ENCOUNTER FOR PREPROCEDURAL RESPIRATORY EXAMINATION: ICD-10-CM

## 2022-01-25 PROCEDURE — 93000 ELECTROCARDIOGRAM COMPLETE: CPT

## 2022-01-25 PROCEDURE — 99215 OFFICE O/P EST HI 40 MIN: CPT | Mod: 25

## 2022-01-25 RX ORDER — METHYLPREDNISOLONE 4 MG/1
4 TABLET ORAL
Qty: 21 | Refills: 0 | Status: DISCONTINUED | COMMUNITY
Start: 2021-09-20 | End: 2022-01-25

## 2022-01-25 RX ORDER — CEFUROXIME AXETIL 500 MG/1
500 TABLET ORAL
Qty: 14 | Refills: 0 | Status: DISCONTINUED | COMMUNITY
Start: 2021-10-13 | End: 2022-01-25

## 2022-01-25 NOTE — REASON FOR VISIT
[Follow-Up] : a follow-up visit [Pulmonary Embolism] : pulmonary embolism [Pre-op Risk Stratification] : pre-op risk stratification

## 2022-01-25 NOTE — DISCUSSION/SUMMARY
[FreeTextEntry1] : Mr. Fleming is a 60-year-old male with history of DVT and bilateral pulmonary embolism with factor V 5 Leiden deficiency who is scheduled for prostate surgery. He has benign prostatic hypertrophy lower urinary tract obstruction. The patient has to self catheterize. Patient is on lifelong anticoagulation as a result of his history of DVT/pulmonary embolism and factor V Leiden deficiency. He will require placement of an IVC filter prior to surgery and before discontinuation of anticoagulation. I have referred him to hematology, Dr. Jaswinder Cano, for preoperative evaluation as well. IVC filter can be removed 4-6 weeks post procedure. Preoperative EKG shows normal sinus rhythm with nonspecific ST-T wave changes. Preoperative lab work is pending.

## 2022-01-25 NOTE — HISTORY OF PRESENT ILLNESS
[TextBox_4] : Mr. Fleming presents for a preoperative pulmonary evaluation. The patient has a history of a DVT involving the common femoral, peroneal and popliteal veins to the knee. He was sent out to the hospital emergency room and had a CT angiogram of the chest which demonstrated bilateral, segmental pulmonary emboli involving all the lower lobes and the right middle lobe. Patient was found to have factor V Leiden deficiency. He is currently on eliquis 5 mg p.o. b.i.d. for life. Patient also has a history of benign prostatic hypertrophy with lower urinary tract obstruction. He has been self catheterizing. Patient is now scheduled for prostate surgery. Anticoagulation will need to be discontinued for the surgery. Mr. Fleming will require placement of an inferior vena cava filter prior to discontinuing anticoagulation therapy. IVC filter can be removed approximately 4-6 weeks post procedure.

## 2022-02-01 LAB
ABO + RH PNL BLD: NORMAL
ALBUMIN SERPL ELPH-MCNC: 4.5 G/DL
ALP BLD-CCNC: 82 U/L
ALT SERPL-CCNC: 20 U/L
ANION GAP SERPL CALC-SCNC: 13 MMOL/L
APTT BLD: 31.1 SEC
AST SERPL-CCNC: 21 U/L
BASOPHILS # BLD AUTO: 0.03 K/UL
BASOPHILS NFR BLD AUTO: 0.4 %
BILIRUB SERPL-MCNC: 0.4 MG/DL
BUN SERPL-MCNC: 17 MG/DL
CALCIUM SERPL-MCNC: 9.5 MG/DL
CHLORIDE SERPL-SCNC: 104 MMOL/L
CO2 SERPL-SCNC: 25 MMOL/L
CREAT SERPL-MCNC: 1.16 MG/DL
EOSINOPHIL # BLD AUTO: 0.16 K/UL
EOSINOPHIL NFR BLD AUTO: 2.1 %
GLUCOSE SERPL-MCNC: 89 MG/DL
HCT VFR BLD CALC: 43.2 %
HGB BLD-MCNC: 14.6 G/DL
IMM GRANULOCYTES NFR BLD AUTO: 0.5 %
INR PPP: 1.15 RATIO
LYMPHOCYTES # BLD AUTO: 1.85 K/UL
LYMPHOCYTES NFR BLD AUTO: 24.1 %
MAN DIFF?: NORMAL
MCHC RBC-ENTMCNC: 33.3 PG
MCHC RBC-ENTMCNC: 33.8 GM/DL
MCV RBC AUTO: 98.4 FL
MONOCYTES # BLD AUTO: 0.77 K/UL
MONOCYTES NFR BLD AUTO: 10 %
NEUTROPHILS # BLD AUTO: 4.83 K/UL
NEUTROPHILS NFR BLD AUTO: 62.9 %
PLATELET # BLD AUTO: 262 K/UL
POTASSIUM SERPL-SCNC: 4.1 MMOL/L
PROT SERPL-MCNC: 6.7 G/DL
PT BLD: 13.6 SEC
RBC # BLD: 4.39 M/UL
RBC # FLD: 13 %
SODIUM SERPL-SCNC: 141 MMOL/L
WBC # FLD AUTO: 7.68 K/UL

## 2022-02-14 ENCOUNTER — NON-APPOINTMENT (OUTPATIENT)
Age: 62
End: 2022-02-14

## 2022-03-04 ENCOUNTER — NON-APPOINTMENT (OUTPATIENT)
Age: 62
End: 2022-03-04

## 2022-03-17 ENCOUNTER — APPOINTMENT (OUTPATIENT)
Dept: INTERNAL MEDICINE | Facility: CLINIC | Age: 62
End: 2022-03-17
Payer: MEDICAID

## 2022-03-17 VITALS
HEART RATE: 78 BPM | OXYGEN SATURATION: 99 % | BODY MASS INDEX: 25.84 KG/M2 | SYSTOLIC BLOOD PRESSURE: 123 MMHG | TEMPERATURE: 98.5 F | DIASTOLIC BLOOD PRESSURE: 80 MMHG | WEIGHT: 195 LBS | HEIGHT: 73 IN | RESPIRATION RATE: 16 BRPM

## 2022-03-17 PROCEDURE — 99214 OFFICE O/P EST MOD 30 MIN: CPT

## 2022-03-17 RX ORDER — INDOMETHACIN 50 MG/1
50 CAPSULE ORAL
Qty: 21 | Refills: 0 | Status: DISCONTINUED | COMMUNITY
Start: 2021-09-13 | End: 2022-03-17

## 2022-03-17 NOTE — HISTORY OF PRESENT ILLNESS
[FreeTextEntry1] : followup evaluation [de-identified] : Mr. Fleming presents for followup evaluation. He is scheduled to have prostate surgery on 4/5/22. The patient has a history of bilateral pulmonary emboli and DVT and is currently on eliquis therapy 5 mg p.o. b.i.d. He is scheduled to have an IVC filter placed on 3/22 and will then discontinue anticoagulation 3 days prior to surgery. He has received hematologic clearance from Dr. Cano. - - -

## 2022-03-17 NOTE — HISTORY OF PRESENT ILLNESS
[FreeTextEntry1] : followup evaluation [de-identified] : Mr. Fleming presents for followup evaluation. He is scheduled to have prostate surgery on 4/5/22. The patient has a history of bilateral pulmonary emboli and DVT and is currently on eliquis therapy 5 mg p.o. b.i.d. He is scheduled to have an IVC filter placed on 3/22 and will then discontinue anticoagulation 3 days prior to surgery. He has received hematologic clearance from Dr. Cano.

## 2022-03-17 NOTE — PLAN
[FreeTextEntry1] : Mr. Fleming is a 60-year-old male who is now scheduled for prostate surgery. In he has a history of bilateral pulmonary emboli and DVT. Patient has factor V Leiden deficiency. He is on chronic anticoagulation. He will receive an IVC filter on 3/22 and then will discontinue anticoagulation 3 days before his surgery which was on 4/5/22. His receive hematology clearance. There is no contraindication to planned procedure with sedation/anesthesia. Followup in 3 months.

## 2022-03-18 ENCOUNTER — OUTPATIENT (OUTPATIENT)
Dept: OUTPATIENT SERVICES | Facility: HOSPITAL | Age: 62
LOS: 1 days | End: 2022-03-18
Payer: MEDICAID

## 2022-03-18 VITALS
WEIGHT: 202.83 LBS | DIASTOLIC BLOOD PRESSURE: 70 MMHG | RESPIRATION RATE: 18 BRPM | HEART RATE: 60 BPM | OXYGEN SATURATION: 98 % | TEMPERATURE: 97 F | SYSTOLIC BLOOD PRESSURE: 130 MMHG | HEIGHT: 73 IN

## 2022-03-18 DIAGNOSIS — Z91.89 OTHER SPECIFIED PERSONAL RISK FACTORS, NOT ELSEWHERE CLASSIFIED: ICD-10-CM

## 2022-03-18 DIAGNOSIS — I10 ESSENTIAL (PRIMARY) HYPERTENSION: ICD-10-CM

## 2022-03-18 DIAGNOSIS — Z29.9 ENCOUNTER FOR PROPHYLACTIC MEASURES, UNSPECIFIED: ICD-10-CM

## 2022-03-18 DIAGNOSIS — N40.1 BENIGN PROSTATIC HYPERPLASIA WITH LOWER URINARY TRACT SYMPTOMS: ICD-10-CM

## 2022-03-18 DIAGNOSIS — I80.00 PHLEBITIS AND THROMBOPHLEBITIS OF SUPERFICIAL VESSELS OF UNSPECIFIED LOWER EXTREMITY: ICD-10-CM

## 2022-03-18 DIAGNOSIS — Z01.818 ENCOUNTER FOR OTHER PREPROCEDURAL EXAMINATION: ICD-10-CM

## 2022-03-18 DIAGNOSIS — D68.51 ACTIVATED PROTEIN C RESISTANCE: ICD-10-CM

## 2022-03-18 DIAGNOSIS — I26.99 OTHER PULMONARY EMBOLISM WITHOUT ACUTE COR PULMONALE: ICD-10-CM

## 2022-03-18 DIAGNOSIS — Z98.890 OTHER SPECIFIED POSTPROCEDURAL STATES: Chronic | ICD-10-CM

## 2022-03-18 LAB
A1C WITH ESTIMATED AVERAGE GLUCOSE RESULT: 5.2 % — SIGNIFICANT CHANGE UP (ref 4–5.6)
ANION GAP SERPL CALC-SCNC: 15 MMOL/L — SIGNIFICANT CHANGE UP (ref 5–17)
APTT BLD: 29.7 SEC — SIGNIFICANT CHANGE UP (ref 27.5–35.5)
BASOPHILS # BLD AUTO: 0.03 K/UL — SIGNIFICANT CHANGE UP (ref 0–0.2)
BASOPHILS NFR BLD AUTO: 0.5 % — SIGNIFICANT CHANGE UP (ref 0–2)
BLD GP AB SCN SERPL QL: SIGNIFICANT CHANGE UP
BUN SERPL-MCNC: 18.2 MG/DL — SIGNIFICANT CHANGE UP (ref 8–20)
CALCIUM SERPL-MCNC: 9.2 MG/DL — SIGNIFICANT CHANGE UP (ref 8.6–10.2)
CHLORIDE SERPL-SCNC: 102 MMOL/L — SIGNIFICANT CHANGE UP (ref 98–107)
CO2 SERPL-SCNC: 22 MMOL/L — SIGNIFICANT CHANGE UP (ref 22–29)
CREAT SERPL-MCNC: 1 MG/DL — SIGNIFICANT CHANGE UP (ref 0.5–1.3)
EGFR: 85 ML/MIN/1.73M2 — SIGNIFICANT CHANGE UP
EOSINOPHIL # BLD AUTO: 0.14 K/UL — SIGNIFICANT CHANGE UP (ref 0–0.5)
EOSINOPHIL NFR BLD AUTO: 2.1 % — SIGNIFICANT CHANGE UP (ref 0–6)
ESTIMATED AVERAGE GLUCOSE: 103 MG/DL — SIGNIFICANT CHANGE UP (ref 68–114)
GLUCOSE SERPL-MCNC: 90 MG/DL — SIGNIFICANT CHANGE UP (ref 70–99)
HCT VFR BLD CALC: 40.4 % — SIGNIFICANT CHANGE UP (ref 39–50)
HGB BLD-MCNC: 14.2 G/DL — SIGNIFICANT CHANGE UP (ref 13–17)
IMM GRANULOCYTES NFR BLD AUTO: 0.6 % — SIGNIFICANT CHANGE UP (ref 0–1.5)
INR BLD: 1.14 RATIO — SIGNIFICANT CHANGE UP (ref 0.88–1.16)
LYMPHOCYTES # BLD AUTO: 1.59 K/UL — SIGNIFICANT CHANGE UP (ref 1–3.3)
LYMPHOCYTES # BLD AUTO: 24.1 % — SIGNIFICANT CHANGE UP (ref 13–44)
MCHC RBC-ENTMCNC: 33 PG — SIGNIFICANT CHANGE UP (ref 27–34)
MCHC RBC-ENTMCNC: 35.1 GM/DL — SIGNIFICANT CHANGE UP (ref 32–36)
MCV RBC AUTO: 94 FL — SIGNIFICANT CHANGE UP (ref 80–100)
MONOCYTES # BLD AUTO: 0.6 K/UL — SIGNIFICANT CHANGE UP (ref 0–0.9)
MONOCYTES NFR BLD AUTO: 9.1 % — SIGNIFICANT CHANGE UP (ref 2–14)
NEUTROPHILS # BLD AUTO: 4.21 K/UL — SIGNIFICANT CHANGE UP (ref 1.8–7.4)
NEUTROPHILS NFR BLD AUTO: 63.6 % — SIGNIFICANT CHANGE UP (ref 43–77)
PLATELET # BLD AUTO: 212 K/UL — SIGNIFICANT CHANGE UP (ref 150–400)
POTASSIUM SERPL-MCNC: 4 MMOL/L — SIGNIFICANT CHANGE UP (ref 3.5–5.3)
POTASSIUM SERPL-SCNC: 4 MMOL/L — SIGNIFICANT CHANGE UP (ref 3.5–5.3)
PROTHROM AB SERPL-ACNC: 13.3 SEC — SIGNIFICANT CHANGE UP (ref 10.5–13.4)
RBC # BLD: 4.3 M/UL — SIGNIFICANT CHANGE UP (ref 4.2–5.8)
RBC # FLD: 12.5 % — SIGNIFICANT CHANGE UP (ref 10.3–14.5)
SODIUM SERPL-SCNC: 139 MMOL/L — SIGNIFICANT CHANGE UP (ref 135–145)
WBC # BLD: 6.61 K/UL — SIGNIFICANT CHANGE UP (ref 3.8–10.5)
WBC # FLD AUTO: 6.61 K/UL — SIGNIFICANT CHANGE UP (ref 3.8–10.5)

## 2022-03-18 PROCEDURE — 93010 ELECTROCARDIOGRAM REPORT: CPT

## 2022-03-18 PROCEDURE — 93005 ELECTROCARDIOGRAM TRACING: CPT

## 2022-03-18 PROCEDURE — G0463: CPT

## 2022-03-18 RX ORDER — ALPRAZOLAM 0.25 MG
1 TABLET ORAL
Qty: 0 | Refills: 0 | DISCHARGE

## 2022-03-18 RX ORDER — CEFAZOLIN SODIUM 1 G
2000 VIAL (EA) INJECTION ONCE
Refills: 0 | Status: DISCONTINUED | OUTPATIENT
Start: 2022-04-05 | End: 2022-04-06

## 2022-03-18 NOTE — H&P PST ADULT - PROBLEM SELECTOR PLAN 1
Patient is scheduled for laser enucleation of the prostate with morcellation on 4/5/22 with Dr Nolasco.

## 2022-03-18 NOTE — H&P PST ADULT - TEMPERATURE IN CELSIUS (DEGREES C)
[facility]  Critical Care Medicine  Progress Note    Patient Name: Navin Beck  MRN: [id number]  Admission Date: 11/26/2020  Hospital Length of Stay: 7 days  Code Status: Full Code  Attending Provider: Chetan Lang MD  Primary Care Provider: Elvira Quinones MD   Principal Problem: Acute kidney injury (LAURIE) with acute tubular necrosis (ATN)    Subjective:     HPI:  Patient is a 60 y.o. female with significant past medical history of HTN, HLD, DM II with recent revision of erosion of implanted vaginal mesh and prosthetic materials on 10/26 presented to hospital complaining of lower abdominal/pelvic pain. Post-op patient had been d/c'd w/ march cath which was removed on 11/19 and pain has been present since then. Patient endorses that she has been noncompliant with her home insulin/diabetes regimen for the last two days because she has not been feeling well. Imaging in ED shows multilobar fluid collection adjacent to bladder concerning for bladder perforation and bilateral hydronephrosis. Labs show a glucose of 626 w/ + bhb of 4.2 and pH 7.27, MICU had been consulted for DKA - started insulin ggt and started treatment for abx. On 12/1 nephrology wanting to start CRRT due to hyponatremia (122 on day of consultation)       Hospital/ICU Course:  60 yaer old female with history of HTN, HLD, noncompliance with T2DM with recent revision of erosion of implanted mesh/prosthetic materials found to have intra-abdominal abscesses sp IR drain placement (11/28), in DKA (beta hydroxy 4.2, glucose 626 and initial bicarb 16), now with anuria requiring CRRT for both metabolic clearance and slow correction of her hyponatremia (120s).     Interval History/Significant Events: No acute events overnight - Received prismax and tolerated well.  Hopeful transition to floor.    Review of Systems   Constitutional: Negative for chills and fever.   HENT: Negative for rhinorrhea and sore throat.    Respiratory:  Negative for cough and shortness of breath.    Cardiovascular: Negative for chest pain and palpitations.   Gastrointestinal: Negative for abdominal pain, constipation, diarrhea and nausea.   Genitourinary: Negative for dysuria and flank pain.   Neurological: Negative for light-headedness, numbness and headaches.     Objective:     Vital Signs (Most Recent):  Temp: 97.9 °F (36.6 °C) (12/04/20 0300)  Pulse: 76 (12/04/20 0416)  Resp: 14 (12/04/20 0416)  BP: (!) 116/56 (12/04/20 0300)  SpO2: 97 % (12/04/20 0416) Vital Signs (24h Range):  Temp:  [97.4 °F (36.3 °C)-98.2 °F (36.8 °C)] 97.9 °F (36.6 °C)  Pulse:  [66-81] 76  Resp:  [11-27] 14  SpO2:  [92 %-99 %] 97 %  BP: (115-160)/(53-75) 116/56   Weight: 115.5 kg (254 lb 10.1 oz)  Body mass index is 39.88 kg/m².      Intake/Output Summary (Last 24 hours) at 12/4/2020 0506  Last data filed at 12/4/2020 0400  Gross per 24 hour   Intake 5160 ml   Output -180 ml   Net 5340 ml       Physical Exam  Constitutional:       General: She is not in acute distress.     Appearance: She is obese. She is not ill-appearing or toxic-appearing.   HENT:      Head: Normocephalic and atraumatic.      Nose: No congestion or rhinorrhea.      Mouth/Throat:      Pharynx: No oropharyngeal exudate or posterior oropharyngeal erythema.   Eyes:      General: No scleral icterus.        Right eye: No discharge.         Left eye: No discharge.      Extraocular Movements: Extraocular movements intact.      Pupils: Pupils are equal, round, and reactive to light.   Neck:      Musculoskeletal: Normal range of motion and neck supple. No neck rigidity or muscular tenderness.   Cardiovascular:      Rate and Rhythm: Normal rate and regular rhythm.      Pulses: Normal pulses.      Heart sounds: No murmur.   Pulmonary:      Effort: Pulmonary effort is normal. No respiratory distress.      Breath sounds: Normal breath sounds. No stridor. No wheezing.   Abdominal:      General: Abdomen is flat. Bowel sounds are  normal. There is no distension.      Palpations: Abdomen is soft. There is no mass.      Tenderness: There is abdominal tenderness (drain in place, dressings clean dry and intact).      Hernia: No hernia is present.      Comments: Drain with purulent fluid   Musculoskeletal: Normal range of motion.         General: No swelling, tenderness or deformity.   Skin:     General: Skin is warm and dry.      Coloration: Skin is not jaundiced or pale.      Findings: No bruising.   Neurological:      General: No focal deficit present.      Mental Status: She is alert. Mental status is at baseline.         Vents:  Oxygen Concentration (%): 28 (12/04/20 0416)  Lines/Drains/Airways     Central Venous Catheter Line            Trialysis (Dialysis) Catheter 12/01/20 1547 right internal jugular 2 days          Drain                 Urethral Catheter 11/27/20 0145 Latex 16 Fr. 7 days         Closed/Suction Drain 11/28/20 1358 Right Abdomen Bulb 10 Fr. 5 days          Peripheral Intravenous Line                 Peripheral IV - Single Lumen 11/30/20 1142 20 G;1 3/4 in Left;Anterior Upper Arm 3 days         Peripheral IV - Single Lumen 11/30/20 1142 20 G;1 3/4 in Right;Anterior Upper Arm 3 days              Significant Labs:    CBC/Anemia Profile:  Recent Labs   Lab 12/03/20  0431   WBC 11.56   HGB 7.6*   HCT 24.1*   *   MCV 92   RDW 13.9        Chemistries:  Recent Labs   Lab 12/03/20  0030 12/03/20  0431 12/03/20  0847  12/03/20  2025 12/03/20  2328 12/04/20  0142   *  123* 122* 123*  123*   < > 126* 125* 127*   K 4.1  4.1 4.4 4.5  4.5   < > 4.0 4.0 4.0   CL 96  96 96 97  97   < > 97 95 97   CO2 20*  20* 18* 19*  19*   < > 21* 20* 22*   BUN 15  15 14 16  16   < > 16 13 12   CREATININE 3.9*  3.9* 4.0* 4.3*  4.3*   < > 4.0* 3.4* 3.1*   CALCIUM 7.0*  7.0* 7.2* 7.2*  7.2*   < > 8.2* 9.0 8.8   ALBUMIN 1.5* 1.5* 1.6*  --   --  1.6*  --    PROT  --  5.7*  --   --   --   --   --    BILITOT  --  0.2  --   --   --   --    --    ALKPHOS  --  179*  --   --   --   --   --    ALT  --  7*  --   --   --   --   --    AST  --  11  --   --   --   --   --    MG 2.2 2.1  --   --   --  2.1  --    PHOS 4.3 4.5 4.7*  --   --  4.2  --     < > = values in this interval not displayed.       BMP:   Recent Labs   Lab 12/03/20 2328 12/04/20  0142   * 253*   * 127*   K 4.0 4.0   CL 95 97   CO2 20* 22*   BUN 13 12   CREATININE 3.4* 3.1*   CALCIUM 9.0 8.8   MG 2.1  --      CMP:   Recent Labs   Lab 12/03/20  0431 12/03/20  0847  12/03/20 2025 12/03/20 2328 12/04/20 0142   * 123*  123*   < > 126* 125* 127*   K 4.4 4.5  4.5   < > 4.0 4.0 4.0   CL 96 97  97   < > 97 95 97   CO2 18* 19*  19*   < > 21* 20* 22*   * 291*  291*   < > 226* 254* 253*   BUN 14 16  16   < > 16 13 12   CREATININE 4.0* 4.3*  4.3*   < > 4.0* 3.4* 3.1*   CALCIUM 7.2* 7.2*  7.2*   < > 8.2* 9.0 8.8   PROT 5.7*  --   --   --   --   --    ALBUMIN 1.5* 1.6*  --   --  1.6*  --    BILITOT 0.2  --   --   --   --   --    ALKPHOS 179*  --   --   --   --   --    AST 11  --   --   --   --   --    ALT 7*  --   --   --   --   --    ANIONGAP 8 7*  7*   < > 8 10 8   EGFRNONAA 11.3* 10.4*  10.4*   < > 11.3* 13.8* 15.4*    < > = values in this interval not displayed.       Significant Imaging:  I have reviewed all pertinent imaging results/findings within the past 24 hours.      ABG  Recent Labs   Lab 12/01/20  0924   PH 7.297*   PO2 98*   PCO2 30.7*   HCO3 15.0*   BE -11     Assessment/Plan:     Pulmonary  Asthma  No PFTs recorded    On duo nebs  Continue to monitor    Cardiac/Vascular  Mixed hyperlipidemia  Home rosuvastatin    Continue rosuvastatin    Essential hypertension  Home benzapril    Holding - normotensive here in hosptial    Renal/  Hyponatremia  See ATN    UTI (urinary tract infection)  Ecoli in urine.    Continue ceftriaxone    Urinary retention  10/2020 with bladder mesh removal, initial urinary retention secondary to anatomic obstruction.  Sp  march placed by urology  Renal us with bilateral hydronephrosis; follow up CT after drains showed no hyrdonephrosis    Continue march    Abnormal computed tomography of bladder  History of gyn surgery 10/2020 for bladder mesh removal, CT abdomen pelvis showing fluid collections, IR drain placed 11/28 showing GPCs,     On Cefepime  Urology following, appreciate recs    Endocrine  Type 2 diabetes mellitus with hyperglycemia, with long-term current use of insulin  Medication noncompliance, hemoglobin A1c 11.4 on admission  Initially on insulin gtt, now BID lantus    Started lantus BID + SSI  Endocrine following      Diabetic ketoacidosis without coma associated with type 2 diabetes mellitus  DKA secondary to medication non-compliance  On day of admission 7.297/30/98. AG gap - 9  Transitioned off DKA protocol yesterday    Endocrine following - appreciate recs   -22U Lantus BID   -Moderate correction scale    Hyperthyroidism  History of Grave's disease 2017  TSH this admission nl  Home methimazole  Endocrine following, appreciate recs    -Continue methimazole 10mg daily    Severe obesity (BMI 35.0-35.9 with comorbidity)  Will assess issue when stepped down    Other  * Acute kidney injury (LAURIE) with acute tubular necrosis (ATN)  ATN secondary to insults from pyelonephritis/urinary obstructions/contrast administration. Central line placed for CRRT needs. Patient with hyponatremia and nephrology following to correct Na slowly from 122.     BMP q4 for na checks   -max na to 130  CRRT orders per nephrology          Critical care was time spent personally by me on the following activities: development of treatment plan with patient or surrogate and bedside caregivers, discussions with consultants, evaluation of patient's response to treatment, examination of patient, ordering and performing treatments and interventions, ordering and review of laboratory studies, ordering and review of radiographic studies, pulse oximetry,  re-evaluation of patient's condition. This critical care time did not overlap with that of any other provider or involve time for any procedures.     Catrina Flanagan MD  Critical Care Medicine  [facility]   36.3

## 2022-03-18 NOTE — H&P PST ADULT - AIRWAY
limited with extension, rotation R worse than left, flexion OK ROM of neck limited with extension, rotation R worse than left, flexion OK/normal

## 2022-03-18 NOTE — H&P PST ADULT - NSICDXPASTMEDICALHX_GEN_ALL_CORE_FT
PAST MEDICAL HISTORY:  DVT, lower extremity     Gout     Hypertension     Pulmonary embolism      PAST MEDICAL HISTORY:  Cervical herniated disc     DVT, lower extremity     Enlarged prostate with lower urinary tract symptoms (LUTS)     Gout     Hypertension     Pulmonary embolism     Rotator cuff tear, right

## 2022-03-18 NOTE — H&P PST ADULT - PROBLEM SELECTOR PLAN 3
BP stable at [facility] today, continue medication, take amlodipine and enalapril DOP. Medical clearance pending

## 2022-03-18 NOTE — H&P PST ADULT - OTHER CARE PROVIDERS
Dr. Deleon PCP, Dr. Deleon PCP [phone] Pulmonologist, Dr. Cano [phone] Dr. Deleon PCP [phone] and Pulmonologist, Dr. Cano [phone]

## 2022-03-18 NOTE — H&P PST ADULT - ASSESSMENT
61 year old male with pmhx hypertension, and gout, LE swelling 10/21 went to  imaging revealed DVT of right extremity extending from the common femoral vein, superficial femoral vein, popliteal to the calf veins, CTA revealed bilateral pulmonary embolism involving the segmental and subsegmental branches of the bilateral lower lobes, right middle lobe, and signs of mild right heart strain, being treated with Eliquis found to have Factor V Leiden. CT abdomen and pelvis revealed markedly distended bladder with trabeculation and enlarged prostate with mild to moderate bilateral hydroureteronephrosis, likely secondary to chronic bladder outlet obstruction. As per patient he had 1500 cc of urine in the bladder, now straight catheterizing at home, reports urine is clear, yellow, denies foul smelling or odorous urine, denies hematuria fever, chills, chest pain, palpitations, shortness of breath. Plan to have IVC filter placed at  Tuesday 3/22/22, prior to holding Eliquis for procedure. He reports some right lower extremity swelling at his ankle. Patient is scheduled for laser enucleation of the prostate with morcellation on 22 with Dr Nolasco. Patient educated on surgical scrub, COVID testing, preadmission instructions, medical, hematology clearance and day of procedure medications, verbalizes understanding. Pt instructed to stop vitamins/supplements/herbal medications/NSAIDS for one week prior to surgery and discuss with PMD. Stop eliquis as per Hematologist.     CAPRINI SCORE    AGE RELATED RISK FACTORS                                                             [ ] Age 41-60 years                                            (1 Point)  [x ] Age: 61-74 years                                           (2 Points)                 [ ] Age= 75 years                                                (3 Points)             DISEASE RELATED RISK FACTORS                                                       [ ] Edema in the lower extremities                 (1 Point)                     [ ] Varicose veins                                               (1 Point)                                 [ x] BMI > 25 Kg/m2                                            (1 Point)                                  [ ] Serious infection (ie PNA)                            (1 Point)                     [ ] Lung disease ( COPD, Emphysema)            (1 Point)                                                                          [ ] Acute myocardial infarction                         (1 Point)                  [ ] Congestive heart failure (in the previous month)  (1 Point)         [ ] Inflammatory bowel disease                            (1 Point)                  [ ] Central venous access, PICC or Port               (2 points)       (within the last month)                                                                [ ] Stroke (in the previous month)                        (5 Points)    [ ] Previous or present malignancy                       (2 points)                                                                                                                                                         HEMATOLOGY RELATED FACTORS                                                         [x ] Prior episodes of VTE                                     (3 Points)                     [ ] Positive family history for VTE                      (3 Points)                  [ ] Prothrombin 08078 A                                     (3 Points)                     [ ] Factor V Leiden                                                (3 Points)                        [ ] Lupus anticoagulants                                      (3 Points)                                                           [ ] Anticardiolipin antibodies                              (3 Points)                                                       [ ] High homocysteine in the blood                   (3 Points)                                             [ ] Other congenital or acquired thrombophilia      (3 Points)                                                [ ] Heparin induced thrombocytopenia                  (3 Points)                                        MOBILITY RELATED FACTORS  [ ] Bed rest                                                         (1 Point)  [ ] Plaster cast                                                    (2 points)  [ ] Bed bound for more than 72 hours           (2 Points)    GENDER SPECIFIC FACTORS  [ ] Pregnancy or had a baby within the last month   (1 Point)  [ ] Post-partum < 6 weeks                                   (1 Point)  [ ] Hormonal therapy  or oral contraception   (1 Point)  [ ] History of pregnancy complications              (1 point)  [ ] Unexplained or recurrent              (1 Point)    OTHER RISK FACTORS                                           (1 Point)  [ ] BMI >40, smoking, diabetes requiring insulin, chemotherapy  blood transfusions and length of surgery over 2 hours    SURGERY RELATED RISK FACTORS  [ ]  Section within the last month     (1 Point)  [ ] Minor surgery                                                  (1 Point)  [ ] Arthroscopic surgery                                       (2 Points)  [x ] Planned major surgery lasting more            (2 Points)      than 45 minutes     [ ] Elective hip or knee joint replacement       (5 points)       surgery                                                TRAUMA RELATED RISK FACTORS  [ ] Fracture of the hip, pelvis, or leg                       (5 Points)  [ ] Spinal cord injury resulting in paralysis             (5 points)       (in the previous month)    [ ] Paralysis  (less than 1 month)                             (5 Points)  [ ] Multiple Trauma within 1 month                        (5 Points)    Total Score [   8     ]    Caprini Score 0-2: Low Risk, NO VTE prophylaxis required for most patients, encourage ambulation  Caprini Score 3-6: Moderate Risk , pharmacologic VTE prophylaxis is indicated for most patients (in the absence of contraindications)  Caprini Score Greater than or =7: High risk, pharmocologic VTE prophylaxis indicated for most patients (in the absence of contraindications)                OPIOID RISK TOOL    MICHELL EACH BOX THAT APPLIES AND ADD TOTALS AT THE END    FAMILY HISTORY OF SUBSTANCE ABUSE                 FEMALE         MALE                                                Alcohol                             [  ]1 pt          [  ]3pts                                               Illegal Durgs                     [  ]2 pts        [  ]3pts                                               Rx Drugs                           [  ]4 pts        [  ]4 pts    PERSONAL HISTORY OF SUBSTANCE ABUSE                                                                                          Alcohol                             [  ]3 pts       [  ]3 pts                                               Illegal Durgs                     [  ]4 pts        [  ]4 pts                                               Rx Drugs                           [  ]5 pts        [  ]5 pts    AGE BETWEEN 16-45 YEARS                                      [  ]1 pt         [  ]1 pt    HISTORY OF PREADOLESCENT   SEXUAL ABUSE                                                             [  ]3 pts        [  ]0pts    PSYCHOLOGICAL DISEASE                     ADD, OCD, Bipolar, Schizophrenia        [  ]2 pts         [  ]2 pts                      Depression                                               [  ]1 pt           [  ]1 pt           SCORING TOTAL   0                                     A score of 3 or lower indicated LOW risk for future opiod abuse  A score of 4 to 7 indicated moderate risk for future opiod abuse  A score of 8 or higher indicates a high risk for opiod abuse

## 2022-03-18 NOTE — H&P PST ADULT - HISTORY OF PRESENT ILLNESS
62 y/o M PMHx significant for hypertension, and gout presents to  after reportedly being found to have an extensive right lower extremity DVT. The patient states that over the past week he has had c/o right lower extremity swelling; outpatient right lower extremity ultrasound revealed an extensive deep venous thrombosis extending from the common femoral vein, superficial femoral vein, popliteal to the calf veins. The patient denied any associated chest pain, palpitations, pleuritic pain, shortness of breath, recent travel, or prior history of blood clots (DVTs/PEs). CTA Chest => revealed bilateral pulmonary embolism involving the segmental and subsegmental branches of the bilateral lower lobes, right middle lobe, and signs of mild right heart strain.   60 y/o M PMHx significant for hypertension, and gout presents to  after reportedly being found to have an extensive right lower extremity DVT. The patient states that over the past week he has had c/o right lower extremity swelling; outpatient right lower extremity ultrasound revealed an extensive deep venous thrombosis extending from the common femoral vein, superficial femoral vein, popliteal to the calf veins. The patient denied any associated chest pain, palpitations, pleuritic pain, shortness of breath, recent travel, or prior history of blood clots (DVTs/PEs). CTA Chest => revealed bilateral pulmonary embolism involving the segmental and subsegmental branches of the bilateral lower lobes, right middle lobe, and signs of mild right heart strain.  1500 cc urine rentention bladder with enlarged prostate  Factor V Leiden   Patient presents to [facility] today with complaints of   denies abdominal pain or distention   pt is straight catheterizing  denies chest pain, palpitations  Patient is scheduled for laser enucleation of the prostate with morcellation on 4/5/22 with Dr Nolasco.  Medical, pulmonary, hematology clearance is pending   IVC filter - to be placed Tuesday at     61 year old male with pmhx hypertension, and gout, recent LE swelling went to  imaging revealed DVT extending from the common femoral vein, superficial femoral vein, popliteal to the calf veins, CTA revealed bilateral pulmonary embolism involving the segmental and subsegmental branches of the bilateral lower lobes, right middle lobe, and signs of mild right heart strain, being treated with Eliquis.   CT abdomen and pelvis revealed markedly distended bladder with trabeculation and enlarged prostate with mild to moderate bilateral hydroureteronephrosis, likely secondary to chronic bladder outlet obstruction. As per patient he had 1500 cc of urine in the bladder, now straight catherizing at home, reports urine is clear, yellow, denies     1500 cc urine rentention bladder with enlarged prostate  Factor V Leiden   Patient presents to PST today with complaints of   denies abdominal pain or distention   pt is straight catheterizing  denies chest pain, palpitations  Patient is scheduled for laser enucleation of the prostate with morcellation on 4/5/22 with Dr Nolasco.  Medical, pulmonary, hematology clearance is pending   IVC filter - to be placed Tuesday at     61 year old male with pmhx hypertension, and gout, LE swelling 10/21 went to  imaging revealed DVT of right extremity extending from the common femoral vein, superficial femoral vein, popliteal to the calf veins, CTA revealed bilateral pulmonary embolism involving the segmental and subsegmental branches of the bilateral lower lobes, right middle lobe, and signs of mild right heart strain, being treated with Eliquis found to have Factor V Leiden. CT abdomen and pelvis revealed markedly distended bladder with trabeculation and enlarged prostate with mild to moderate bilateral hydroureteronephrosis, likely secondary to chronic bladder outlet obstruction. As per patient he had 1500 cc of urine in the bladder, now straight catheterizing at home, reports urine is clear, yellow, denies foul smelling or odorous urine, denies hematuria fever, chills, chest pain, palpitations, shortness of breath. Plan to have IVC filter placed at  Tuesday 3/22/22, prior to holding Eliquis for procedure. He reports some right lower extremity swelling at his ankle. Patient is scheduled for laser enucleation of the prostate with morcellation on 4/5/22 with Dr Nolasco.  Medical, pulmonary, hematology clearance is pending

## 2022-03-18 NOTE — ASU PATIENT PROFILE, ADULT - FALL HARM RISK - UNIVERSAL INTERVENTIONS
Bed in lowest position, wheels locked, appropriate side rails in place/Call bell, personal items and telephone in reach/Instruct patient to call for assistance before getting out of bed or chair/Non-slip footwear when patient is out of bed/[place] to call system/Physically safe environment - no spills, clutter or unnecessary equipment/Purposeful Proactive Rounding/Room/bathroom lighting operational, light cord in reach

## 2022-03-18 NOTE — H&P PST ADULT - PROBLEM SELECTOR PLAN 4
On ELiquis for DVT/PE, plan for IVC Filter Tuesday 3/22 at [facility], plan to stop eliquis 48 hours as per hematologist, clearance pending

## 2022-03-20 ENCOUNTER — TRANSCRIPTION ENCOUNTER (OUTPATIENT)
Age: 62
End: 2022-03-20

## 2022-03-22 ENCOUNTER — OUTPATIENT (OUTPATIENT)
Dept: INPATIENT UNIT | Facility: HOSPITAL | Age: 62
LOS: 1 days | Discharge: HOME | End: 2022-03-22
Payer: MEDICAID

## 2022-03-22 ENCOUNTER — RESULT REVIEW (OUTPATIENT)
Age: 62
End: 2022-03-22

## 2022-03-22 VITALS
OXYGEN SATURATION: 100 % | HEIGHT: 73 IN | DIASTOLIC BLOOD PRESSURE: 95 MMHG | WEIGHT: 195.11 LBS | HEART RATE: 78 BPM | RESPIRATION RATE: 15 BRPM | SYSTOLIC BLOOD PRESSURE: 169 MMHG | TEMPERATURE: 98 F

## 2022-03-22 VITALS
RESPIRATION RATE: 16 BRPM | TEMPERATURE: 98 F | DIASTOLIC BLOOD PRESSURE: 92 MMHG | OXYGEN SATURATION: 100 % | SYSTOLIC BLOOD PRESSURE: 170 MMHG | HEART RATE: 80 BPM

## 2022-03-22 DIAGNOSIS — Z98.890 OTHER SPECIFIED POSTPROCEDURAL STATES: Chronic | ICD-10-CM

## 2022-03-22 DIAGNOSIS — N13.8 OTHER OBSTRUCTIVE AND REFLUX UROPATHY: ICD-10-CM

## 2022-03-22 DIAGNOSIS — D68.2 HEREDITARY DEFICIENCY OF OTHER CLOTTING FACTORS: ICD-10-CM

## 2022-03-22 DIAGNOSIS — N40.1 BENIGN PROSTATIC HYPERPLASIA WITH LOWER URINARY TRACT SYMPTOMS: ICD-10-CM

## 2022-03-22 LAB
ANION GAP SERPL CALC-SCNC: 7 MMOL/L — SIGNIFICANT CHANGE UP (ref 5–17)
BUN SERPL-MCNC: 17 MG/DL — SIGNIFICANT CHANGE UP (ref 7–23)
CALCIUM SERPL-MCNC: 9.4 MG/DL — SIGNIFICANT CHANGE UP (ref 8.5–10.1)
CHLORIDE SERPL-SCNC: 103 MMOL/L — SIGNIFICANT CHANGE UP (ref 96–108)
CO2 SERPL-SCNC: 25 MMOL/L — SIGNIFICANT CHANGE UP (ref 22–31)
CREAT SERPL-MCNC: 1.16 MG/DL — SIGNIFICANT CHANGE UP (ref 0.5–1.3)
EGFR: 71 ML/MIN/1.73M2 — SIGNIFICANT CHANGE UP
GLUCOSE SERPL-MCNC: 115 MG/DL — HIGH (ref 70–99)
HCT VFR BLD CALC: 41 % — SIGNIFICANT CHANGE UP (ref 39–50)
HGB BLD-MCNC: 14.8 G/DL — SIGNIFICANT CHANGE UP (ref 13–17)
INR BLD: 1.32 RATIO — HIGH (ref 0.88–1.16)
MCHC RBC-ENTMCNC: 33.5 PG — SIGNIFICANT CHANGE UP (ref 27–34)
MCHC RBC-ENTMCNC: 36.1 GM/DL — HIGH (ref 32–36)
MCV RBC AUTO: 92.8 FL — SIGNIFICANT CHANGE UP (ref 80–100)
PLATELET # BLD AUTO: 198 K/UL — SIGNIFICANT CHANGE UP (ref 150–400)
POTASSIUM SERPL-MCNC: 4.2 MMOL/L — SIGNIFICANT CHANGE UP (ref 3.5–5.3)
POTASSIUM SERPL-SCNC: 4.2 MMOL/L — SIGNIFICANT CHANGE UP (ref 3.5–5.3)
PROTHROM AB SERPL-ACNC: 15.3 SEC — HIGH (ref 10.5–13.4)
RBC # BLD: 4.42 M/UL — SIGNIFICANT CHANGE UP (ref 4.2–5.8)
RBC # FLD: 12.4 % — SIGNIFICANT CHANGE UP (ref 10.3–14.5)
SODIUM SERPL-SCNC: 135 MMOL/L — SIGNIFICANT CHANGE UP (ref 135–145)
WBC # BLD: 6.31 K/UL — SIGNIFICANT CHANGE UP (ref 3.8–10.5)
WBC # FLD AUTO: 6.31 K/UL — SIGNIFICANT CHANGE UP (ref 3.8–10.5)

## 2022-03-22 PROCEDURE — 36415 COLL VENOUS BLD VENIPUNCTURE: CPT

## 2022-03-22 PROCEDURE — C1769: CPT

## 2022-03-22 PROCEDURE — 93005 ELECTROCARDIOGRAM TRACING: CPT

## 2022-03-22 PROCEDURE — 37191 INS ENDOVAS VENA CAVA FILTR: CPT

## 2022-03-22 PROCEDURE — C1880: CPT

## 2022-03-22 PROCEDURE — 93010 ELECTROCARDIOGRAM REPORT: CPT

## 2022-03-22 PROCEDURE — 80048 BASIC METABOLIC PNL TOTAL CA: CPT

## 2022-03-22 PROCEDURE — 85027 COMPLETE CBC AUTOMATED: CPT

## 2022-03-22 PROCEDURE — 85610 PROTHROMBIN TIME: CPT

## 2022-03-22 RX ORDER — COLCHICINE 0.6 MG
0 TABLET ORAL
Qty: 0 | Refills: 0 | DISCHARGE

## 2022-03-22 NOTE — ASU PATIENT PROFILE, ADULT - FALL HARM RISK - UNIVERSAL INTERVENTIONS
Bed in lowest position, wheels locked, appropriate side rails in place/Call bell, personal items and telephone in reach/Instruct patient to call for assistance before getting out of bed or chair/Non-slip footwear when patient is out of bed/[place] to call system/Physically safe environment - no spills, clutter or unnecessary equipment/Purposeful Proactive Rounding/Room/bathroom lighting operational, light cord in reach

## 2022-03-22 NOTE — PRE PROCEDURE NOTE - PRE PROCEDURE EVALUATION
Interventional Radiology    HPI: 61y Male with Factor V Leiden and history of PE on Eliquis requiring surgery for BPH. Anticoagulation to be held temporarily    Allergies:   Medications (Abx/Cardiac/Anticoagulation/Blood Products)      Data:  185.4  88.5  T(C): 36.4  HR: 78  BP: 169/95  RR: 15  SpO2: 100%    Exam  General: No acute distress  Chest: Non labored breathing  Abdomen: Non-distended  Extremities: No swelling, warm    -WBC 6.31 / HgB 14.8 / Hct 41.0 / Plt 198  -Na 135 / Cl 103 / BUN 17 / Glucose 115  -K 4.2 / CO2 25 / Cr 1.16  -ALT -- / Alk Phos -- / T.Bili --  -INR1.32    Imaging:     Plan: 61y Male presents for IVC filter placmenet  -Plan for filter for 4-6 weeks  -Risks/Benefits/alternatives explained with the patient and/or healthcare proxy and witnessed informed consent obtained.    Interventional Radiology    HPI: 61y Male with Factor V Leiden and history of PE on Eliquis requiring surgery for BPH. Anticoagulation to be held temporarily    Allergies:   Medications (Abx/Cardiac/Anticoagulation/Blood Products)      Data:  185.4  88.5  T(C): 36.4  HR: 78  BP: 169/95  RR: 15  SpO2: 100%    Exam  General: No acute distress  Chest: Non labored breathing  Abdomen: Non-distended  Extremities: No swelling, warm    -WBC 6.31 / HgB 14.8 / Hct 41.0 / Plt 198  -Na 135 / Cl 103 / BUN 17 / Glucose 115  -K 4.2 / CO2 25 / Cr 1.16  -ALT -- / Alk Phos -- / T.Bili --  -INR1.32    Imaging:     Plan: 61y Male presents for IVC filter placmenet  -Plan for filter removal in 4-6 weeks based on clearance to restart anti coagulation from surgery  -Patient has herniated discs in his neck which makes turning to the left uncomfortable. Will plan for femoral vein access  -Risks/Benefits/alternatives explained with the patient and/or healthcare proxy and witnessed informed consent obtained.

## 2022-03-22 NOTE — ASU DISCHARGE PLAN (ADULT/PEDIATRIC) - NS MD DC FALL RISK RISK
For information on Fall & Injury Prevention, visit: [email or web address] OR  [email or web address] OR  https://www.cdc.gov/steadi/patient.html

## 2022-03-22 NOTE — ASU PATIENT PROFILE, ADULT - NSICDXPASTMEDICALHX_GEN_ALL_CORE_FT
PAST MEDICAL HISTORY:  Cervical herniated disc     DVT, lower extremity     Enlarged prostate with lower urinary tract symptoms (LUTS)     Gout     Hypertension     Pulmonary embolism     Rotator cuff tear, right

## 2022-03-23 DIAGNOSIS — I26.99 OTHER PULMONARY EMBOLISM WITHOUT ACUTE COR PULMONALE: ICD-10-CM

## 2022-03-23 PROBLEM — Z00.00 ENCOUNTER FOR PREVENTIVE HEALTH EXAMINATION: Noted: 2022-03-23

## 2022-03-28 PROBLEM — I82.409 ACUTE EMBOLISM AND THROMBOSIS OF UNSPECIFIED DEEP VEINS OF UNSPECIFIED LOWER EXTREMITY: Chronic | Status: ACTIVE | Noted: 2022-03-18

## 2022-03-28 PROBLEM — N40.1 BENIGN PROSTATIC HYPERPLASIA WITH LOWER URINARY TRACT SYMPTOMS: Chronic | Status: ACTIVE | Noted: 2022-03-18

## 2022-03-28 PROBLEM — I26.99 OTHER PULMONARY EMBOLISM WITHOUT ACUTE COR PULMONALE: Chronic | Status: ACTIVE | Noted: 2022-03-18

## 2022-03-28 PROBLEM — M75.101 UNSPECIFIED ROTATOR CUFF TEAR OR RUPTURE OF RIGHT SHOULDER, NOT SPECIFIED AS TRAUMATIC: Chronic | Status: ACTIVE | Noted: 2022-03-18

## 2022-03-28 PROBLEM — M50.20 OTHER CERVICAL DISC DISPLACEMENT, UNSPECIFIED CERVICAL REGION: Chronic | Status: ACTIVE | Noted: 2022-03-18

## 2022-03-30 ENCOUNTER — OUTPATIENT (OUTPATIENT)
Dept: OUTPATIENT SERVICES | Facility: HOSPITAL | Age: 62
LOS: 1 days | End: 2022-03-30
Payer: MEDICAID

## 2022-03-30 DIAGNOSIS — Z01.812 ENCOUNTER FOR PREPROCEDURAL LABORATORY EXAMINATION: ICD-10-CM

## 2022-03-30 DIAGNOSIS — Z98.890 OTHER SPECIFIED POSTPROCEDURAL STATES: Chronic | ICD-10-CM

## 2022-03-30 LAB
APPEARANCE UR: CLEAR — SIGNIFICANT CHANGE UP
BACTERIA # UR AUTO: ABNORMAL
BILIRUB UR-MCNC: NEGATIVE — SIGNIFICANT CHANGE UP
COLOR SPEC: YELLOW — SIGNIFICANT CHANGE UP
DIFF PNL FLD: NEGATIVE — SIGNIFICANT CHANGE UP
EPI CELLS # UR: SIGNIFICANT CHANGE UP
GLUCOSE UR QL: NEGATIVE MG/DL — SIGNIFICANT CHANGE UP
KETONES UR-MCNC: NEGATIVE — SIGNIFICANT CHANGE UP
LEUKOCYTE ESTERASE UR-ACNC: NEGATIVE — SIGNIFICANT CHANGE UP
NITRITE UR-MCNC: NEGATIVE — SIGNIFICANT CHANGE UP
PH UR: 6 — SIGNIFICANT CHANGE UP (ref 5–8)
PROT UR-MCNC: 15
RBC CASTS # UR COMP ASSIST: SIGNIFICANT CHANGE UP /HPF (ref 0–4)
SP GR SPEC: 1.02 — SIGNIFICANT CHANGE UP (ref 1.01–1.02)
UROBILINOGEN FLD QL: NEGATIVE MG/DL — SIGNIFICANT CHANGE UP
WBC UR QL: SIGNIFICANT CHANGE UP /HPF (ref 0–5)

## 2022-03-30 PROCEDURE — 87086 URINE CULTURE/COLONY COUNT: CPT

## 2022-03-30 PROCEDURE — 81001 URINALYSIS AUTO W/SCOPE: CPT

## 2022-04-01 LAB
CULTURE RESULTS: NO GROWTH — SIGNIFICANT CHANGE UP
SPECIMEN SOURCE: SIGNIFICANT CHANGE UP

## 2022-04-03 ENCOUNTER — TRANSCRIPTION ENCOUNTER (OUTPATIENT)
Age: 62
End: 2022-04-03

## 2022-04-04 ENCOUNTER — TRANSCRIPTION ENCOUNTER (OUTPATIENT)
Age: 62
End: 2022-04-04

## 2022-04-05 ENCOUNTER — RESULT REVIEW (OUTPATIENT)
Age: 62
End: 2022-04-05

## 2022-04-05 ENCOUNTER — INPATIENT (INPATIENT)
Facility: HOSPITAL | Age: 62
LOS: 0 days | Discharge: HOME | DRG: 713 | End: 2022-04-06
Attending: STUDENT IN AN ORGANIZED HEALTH CARE EDUCATION/TRAINING PROGRAM | Admitting: STUDENT IN AN ORGANIZED HEALTH CARE EDUCATION/TRAINING PROGRAM
Payer: MEDICAID

## 2022-04-05 ENCOUNTER — APPOINTMENT (OUTPATIENT)
Dept: UROLOGY | Facility: HOSPITAL | Age: 62
End: 2022-04-05

## 2022-04-05 VITALS
TEMPERATURE: 98 F | WEIGHT: 202.83 LBS | SYSTOLIC BLOOD PRESSURE: 183 MMHG | HEART RATE: 87 BPM | HEIGHT: 73 IN | OXYGEN SATURATION: 100 % | DIASTOLIC BLOOD PRESSURE: 97 MMHG | RESPIRATION RATE: 12 BRPM

## 2022-04-05 DIAGNOSIS — N40.1 BENIGN PROSTATIC HYPERPLASIA WITH LOWER URINARY TRACT SYMPTOMS: ICD-10-CM

## 2022-04-05 DIAGNOSIS — Z98.890 OTHER SPECIFIED POSTPROCEDURAL STATES: Chronic | ICD-10-CM

## 2022-04-05 LAB
ANION GAP SERPL CALC-SCNC: 12 MMOL/L — SIGNIFICANT CHANGE UP (ref 5–17)
BASOPHILS # BLD AUTO: 0.01 K/UL — SIGNIFICANT CHANGE UP (ref 0–0.2)
BASOPHILS NFR BLD AUTO: 0.1 % — SIGNIFICANT CHANGE UP (ref 0–2)
BUN SERPL-MCNC: 16.7 MG/DL — SIGNIFICANT CHANGE UP (ref 8–20)
CALCIUM SERPL-MCNC: 8.8 MG/DL — SIGNIFICANT CHANGE UP (ref 8.6–10.2)
CHLORIDE SERPL-SCNC: 105 MMOL/L — SIGNIFICANT CHANGE UP (ref 98–107)
CO2 SERPL-SCNC: 24 MMOL/L — SIGNIFICANT CHANGE UP (ref 22–29)
CREAT SERPL-MCNC: 1.11 MG/DL — SIGNIFICANT CHANGE UP (ref 0.5–1.3)
EGFR: 75 ML/MIN/1.73M2 — SIGNIFICANT CHANGE UP
EOSINOPHIL # BLD AUTO: 0.04 K/UL — SIGNIFICANT CHANGE UP (ref 0–0.5)
EOSINOPHIL NFR BLD AUTO: 0.5 % — SIGNIFICANT CHANGE UP (ref 0–6)
GLUCOSE SERPL-MCNC: 109 MG/DL — HIGH (ref 70–99)
HCT VFR BLD CALC: 37.2 % — LOW (ref 39–50)
HGB BLD-MCNC: 13.3 G/DL — SIGNIFICANT CHANGE UP (ref 13–17)
IMM GRANULOCYTES NFR BLD AUTO: 0.4 % — SIGNIFICANT CHANGE UP (ref 0–1.5)
LYMPHOCYTES # BLD AUTO: 0.78 K/UL — LOW (ref 1–3.3)
LYMPHOCYTES # BLD AUTO: 10.2 % — LOW (ref 13–44)
MCHC RBC-ENTMCNC: 33.8 PG — SIGNIFICANT CHANGE UP (ref 27–34)
MCHC RBC-ENTMCNC: 35.8 GM/DL — SIGNIFICANT CHANGE UP (ref 32–36)
MCV RBC AUTO: 94.7 FL — SIGNIFICANT CHANGE UP (ref 80–100)
MONOCYTES # BLD AUTO: 0.34 K/UL — SIGNIFICANT CHANGE UP (ref 0–0.9)
MONOCYTES NFR BLD AUTO: 4.5 % — SIGNIFICANT CHANGE UP (ref 2–14)
NEUTROPHILS # BLD AUTO: 6.44 K/UL — SIGNIFICANT CHANGE UP (ref 1.8–7.4)
NEUTROPHILS NFR BLD AUTO: 84.3 % — HIGH (ref 43–77)
PLATELET # BLD AUTO: 192 K/UL — SIGNIFICANT CHANGE UP (ref 150–400)
POTASSIUM SERPL-MCNC: 4.5 MMOL/L — SIGNIFICANT CHANGE UP (ref 3.5–5.3)
POTASSIUM SERPL-SCNC: 4.5 MMOL/L — SIGNIFICANT CHANGE UP (ref 3.5–5.3)
RBC # BLD: 3.93 M/UL — LOW (ref 4.2–5.8)
RBC # FLD: 12.5 % — SIGNIFICANT CHANGE UP (ref 10.3–14.5)
SODIUM SERPL-SCNC: 141 MMOL/L — SIGNIFICANT CHANGE UP (ref 135–145)
WBC # BLD: 7.64 K/UL — SIGNIFICANT CHANGE UP (ref 3.8–10.5)
WBC # FLD AUTO: 7.64 K/UL — SIGNIFICANT CHANGE UP (ref 3.8–10.5)

## 2022-04-05 PROCEDURE — 88305 TISSUE EXAM BY PATHOLOGIST: CPT | Mod: 26

## 2022-04-05 DEVICE — LASER FIBER SOLTIVE 365: Type: IMPLANTABLE DEVICE | Status: FUNCTIONAL

## 2022-04-05 RX ORDER — AMLODIPINE BESYLATE 2.5 MG/1
5 TABLET ORAL DAILY
Refills: 0 | Status: DISCONTINUED | OUTPATIENT
Start: 2022-04-05 | End: 2022-04-06

## 2022-04-05 RX ORDER — HEPARIN SODIUM 5000 [USP'U]/ML
5000 INJECTION INTRAVENOUS; SUBCUTANEOUS EVERY 8 HOURS
Refills: 0 | Status: DISCONTINUED | OUTPATIENT
Start: 2022-04-05 | End: 2022-04-06

## 2022-04-05 RX ORDER — ACETAMINOPHEN 500 MG
975 TABLET ORAL ONCE
Refills: 0 | Status: COMPLETED | OUTPATIENT
Start: 2022-04-05 | End: 2022-04-05

## 2022-04-05 RX ORDER — ONDANSETRON 8 MG/1
4 TABLET, FILM COATED ORAL EVERY 6 HOURS
Refills: 0 | Status: DISCONTINUED | OUTPATIENT
Start: 2022-04-05 | End: 2022-04-06

## 2022-04-05 RX ORDER — SODIUM CHLORIDE 9 MG/ML
1000 INJECTION, SOLUTION INTRAVENOUS
Refills: 0 | Status: DISCONTINUED | OUTPATIENT
Start: 2022-04-05 | End: 2022-04-06

## 2022-04-05 RX ORDER — FUROSEMIDE 40 MG
10 TABLET ORAL ONCE
Refills: 0 | Status: COMPLETED | OUTPATIENT
Start: 2022-04-05 | End: 2022-04-05

## 2022-04-05 RX ORDER — ALLOPURINOL 300 MG
300 TABLET ORAL DAILY
Refills: 0 | Status: DISCONTINUED | OUTPATIENT
Start: 2022-04-05 | End: 2022-04-06

## 2022-04-05 RX ORDER — HYDROMORPHONE HYDROCHLORIDE 2 MG/ML
0.5 INJECTION INTRAMUSCULAR; INTRAVENOUS; SUBCUTANEOUS
Refills: 0 | Status: DISCONTINUED | OUTPATIENT
Start: 2022-04-05 | End: 2022-04-05

## 2022-04-05 RX ORDER — ACETAMINOPHEN 500 MG
1000 TABLET ORAL ONCE
Refills: 0 | Status: COMPLETED | OUTPATIENT
Start: 2022-04-05 | End: 2022-04-05

## 2022-04-05 RX ORDER — ALPRAZOLAM 0.25 MG
0.5 TABLET ORAL ONCE
Refills: 0 | Status: DISCONTINUED | OUTPATIENT
Start: 2022-04-05 | End: 2022-04-05

## 2022-04-05 RX ORDER — SODIUM CHLORIDE 9 MG/ML
3 INJECTION INTRAMUSCULAR; INTRAVENOUS; SUBCUTANEOUS ONCE
Refills: 0 | Status: DISCONTINUED | OUTPATIENT
Start: 2022-04-05 | End: 2022-04-05

## 2022-04-05 RX ORDER — FUROSEMIDE 40 MG
10 TABLET ORAL DAILY
Refills: 0 | Status: COMPLETED | OUTPATIENT
Start: 2022-04-06 | End: 2022-04-06

## 2022-04-05 RX ORDER — CEPHALEXIN 500 MG
500 CAPSULE ORAL THREE TIMES A DAY
Refills: 0 | Status: DISCONTINUED | OUTPATIENT
Start: 2022-04-06 | End: 2022-04-06

## 2022-04-05 RX ADMIN — Medication 400 MILLIGRAM(S): at 15:00

## 2022-04-05 RX ADMIN — Medication 0.5 MILLIGRAM(S): at 22:41

## 2022-04-05 RX ADMIN — HEPARIN SODIUM 5000 UNIT(S): 5000 INJECTION INTRAVENOUS; SUBCUTANEOUS at 21:12

## 2022-04-05 RX ADMIN — SODIUM CHLORIDE 75 MILLILITER(S): 9 INJECTION, SOLUTION INTRAVENOUS at 21:11

## 2022-04-05 RX ADMIN — Medication 10 MILLIGRAM(S): at 14:35

## 2022-04-05 NOTE — PATIENT PROFILE ADULT - FALL HARM RISK - HARM RISK INTERVENTIONS

## 2022-04-06 ENCOUNTER — TRANSCRIPTION ENCOUNTER (OUTPATIENT)
Age: 62
End: 2022-04-06

## 2022-04-06 ENCOUNTER — NON-APPOINTMENT (OUTPATIENT)
Age: 62
End: 2022-04-06

## 2022-04-06 VITALS
SYSTOLIC BLOOD PRESSURE: 126 MMHG | HEART RATE: 83 BPM | OXYGEN SATURATION: 97 % | DIASTOLIC BLOOD PRESSURE: 76 MMHG | RESPIRATION RATE: 18 BRPM | TEMPERATURE: 98 F

## 2022-04-06 PROCEDURE — 36415 COLL VENOUS BLD VENIPUNCTURE: CPT

## 2022-04-06 PROCEDURE — 80048 BASIC METABOLIC PNL TOTAL CA: CPT

## 2022-04-06 PROCEDURE — 85025 COMPLETE CBC W/AUTO DIFF WBC: CPT

## 2022-04-06 PROCEDURE — C1889: CPT

## 2022-04-06 PROCEDURE — 88305 TISSUE EXAM BY PATHOLOGIST: CPT

## 2022-04-06 RX ORDER — ACETAMINOPHEN 500 MG
650 TABLET ORAL EVERY 6 HOURS
Refills: 0 | Status: DISCONTINUED | OUTPATIENT
Start: 2022-04-06 | End: 2022-04-06

## 2022-04-06 RX ORDER — FINASTERIDE 5 MG/1
1 TABLET, FILM COATED ORAL
Qty: 0 | Refills: 0 | DISCHARGE

## 2022-04-06 RX ORDER — CEPHALEXIN 500 MG
1 CAPSULE ORAL
Qty: 9 | Refills: 0
Start: 2022-04-06 | End: 2022-04-08

## 2022-04-06 RX ADMIN — Medication 300 MILLIGRAM(S): at 12:17

## 2022-04-06 RX ADMIN — Medication 20 MILLIGRAM(S): at 05:33

## 2022-04-06 RX ADMIN — HEPARIN SODIUM 5000 UNIT(S): 5000 INJECTION INTRAVENOUS; SUBCUTANEOUS at 12:17

## 2022-04-06 RX ADMIN — Medication 650 MILLIGRAM(S): at 09:41

## 2022-04-06 RX ADMIN — Medication 500 MILLIGRAM(S): at 05:34

## 2022-04-06 RX ADMIN — Medication 10 MILLIGRAM(S): at 05:33

## 2022-04-06 RX ADMIN — AMLODIPINE BESYLATE 5 MILLIGRAM(S): 2.5 TABLET ORAL at 05:34

## 2022-04-06 RX ADMIN — Medication 500 MILLIGRAM(S): at 13:01

## 2022-04-06 RX ADMIN — Medication 650 MILLIGRAM(S): at 10:13

## 2022-04-06 NOTE — PROGRESS NOTE ADULT - ASSESSMENT
62 yo male s/p HoLEP POD#1  - vargas cath d/c'd  - cont dash diet, pt encouraged to drink fluids  - OOB to chair/ambulate  - TOV, PVR after 1st void  - d/c planning for today. with vargas if pt fails voiding trial  - pt understands plan and agrees

## 2022-04-06 NOTE — DISCHARGE NOTE PROVIDER - PROVIDER TOKENS
PROVIDER:[TOKEN:[[id number]],FOLLOWUP:[1 week]] PROVIDER:[TOKEN:[[id number]],SCHEDULEDAPPT:[04/08/2022]]

## 2022-04-06 NOTE — DISCHARGE NOTE PROVIDER - CARE PROVIDER_API CALL
Seth Nolasco)  Urology  [address]  [place] [address]  Phone: [phone]  Fax: [phone]  Follow Up Time: 1 week   Seth Nolasco)  Urology  [address], [place] [address]  Phone: [phone]  Fax: [phone]  Scheduled Appointment: 04/08/2022

## 2022-04-06 NOTE — CHART NOTE - NSCHARTNOTEFT_GEN_A_CORE
Pt voided 200ml earlier, 470ml on bladder scan  pt had urge to void again, unable to void  Vargas cath replaced 550ml drained  d/c home with vargas to leg bag

## 2022-04-06 NOTE — DISCHARGE NOTE NURSING/CASE MANAGEMENT/SOCIAL WORK - NSDCPEFALRISK_GEN_ALL_CORE
For information on Fall & Injury Prevention, visit: [email or web address] OR  [email or web address] OR  https://www.cdc.gov/steadi/patient.html

## 2022-04-06 NOTE — DISCHARGE NOTE PROVIDER - NSDCCPCAREPLAN_GEN_ALL_CORE_FT
PRINCIPAL DISCHARGE DIAGNOSIS  Diagnosis: Enlarged prostate with urinary obstruction  Assessment and Plan of Treatment: laser enucleation of prostate 4/5/22  take antibiotics until finished  call the office if you have fever greater than 101'F, nausea and/or vomiting  no heavy lifting or exercise       PRINCIPAL DISCHARGE DIAGNOSIS  Diagnosis: Enlarged prostate with urinary obstruction  Assessment and Plan of Treatment: laser enucleation of prostate 4/5/22  take antibiotics until finished  call the office if you have fever greater than 101'F, nausea and/or vomiting  no heavy lifting or exercise  empty your leg bag as needed

## 2022-04-06 NOTE — DISCHARGE NOTE PROVIDER - HOSPITAL COURSE
62 yo male admitted for elective laser enucleation of prostate 4/5/22.  Pt underwent his procedure uneventfully, his CBI was held in PACU, urine was clear.  Pt's urine remained clear, light yellow off CBI.  Pt remained afebrile, VSS and was tolerating a diet.  The vargas catheter was d/c'd POD#1 in the am.  Pt has been OOB and ambulating. 60 yo male admitted for elective laser enucleation of prostate 4/5/22.  Pt underwent his procedure uneventfully, his CBI was held in PACU, urine was clear.  Pt's urine remained clear, light yellow off CBI.  Pt remained afebrile, VSS and was tolerating a diet.  The vargas catheter was d/c'd POD#1 in the am.  Pt has been OOB and ambulating.  Pt voided 200ml, bladder scan showed 470ml for PVR.  Pt attempted to void again, was unable to void.  16Fr vargas cath placed, 550ml drained.  Pt will be d/c'd with vargas to leg bag and have voiding trial in office.

## 2022-04-06 NOTE — PROGRESS NOTE ADULT - SUBJECTIVE AND OBJECTIVE BOX
Subjective:61yMale POD#1 s/p laser enucleation of prostate.  Pt feeling well, no c/o pain or discomfort.  CBI clamped since yesterday afternoon, urine remains light yellow, clear. Pt tolerating diet.    Avitia: light yellow, clear    Vital Signs Last 24 Hrs  T(C): 36.7 (06 Apr 2022 04:41), Max: 36.9 (05 Apr 2022 21:42)  T(F): 98.1 (06 Apr 2022 04:41), Max: 98.4 (05 Apr 2022 21:42)  HR: 91 (06 Apr 2022 04:41) (77 - 91)  BP: 147/72 (06 Apr 2022 04:41) (104/74 - 176/89)  BP(mean): 85 (05 Apr 2022 15:30) (77 - 97)  RR: 18 (06 Apr 2022 04:41) (10 - 19)  SpO2: 96% (06 Apr 2022 04:41) (95% - 100%)  I&O's Detail    05 Apr 2022 07:01  -  06 Apr 2022 07:00  --------------------------------------------------------  IN:    Oral Fluid: 350 mL  Total IN: 350 mL    OUT:    Indwelling Catheter - Urethral (mL): 3550 mL  Total OUT: 3550 mL    Total NET: -3200 mL          Labs:                        13.3   7.64  )-----------( 192      ( 05 Apr 2022 14:37 )             37.2     04-05    141  |  105  |  16.7  ----------------------------<  109<H>  4.5   |  24.0  |  1.11    Ca    8.8      05 Apr 2022 14:37

## 2022-04-06 NOTE — DISCHARGE NOTE PROVIDER - NSDCFUADDINST_GEN_ALL_CORE_FT
drink plenty of fluids  no heavy lifting or straining drink plenty of fluids  no heavy lifting or straining  empty leg bag as needed

## 2022-04-06 NOTE — DISCHARGE NOTE PROVIDER - NSDCCPTREATMENT_GEN_ALL_CORE_FT
PRINCIPAL PROCEDURE  Procedure: Cystoscopy, with prostate enucleation using laser  Findings and Treatment: cbi discontinued, urine clear on POD#1

## 2022-04-06 NOTE — DISCHARGE NOTE PROVIDER - NSDCFUSCHEDAPPT_GEN_ALL_CORE_FT
CRISTAL BECKFORD ; 04/18/2022 ; [facility] [address] CRISTAL BECKFORD ; 04/18/2022 ; [facility] [address] CRISTAL Shepherd ; 04/20/2022 ; [facility] [address] [facility]  CRISTAL BECKFORD ; 04/20/2022 ; [facility] [address] [place] [address]

## 2022-04-06 NOTE — CHART NOTE - NSCHARTNOTEFT_GEN_A_CORE
Late entry evaluated patient earlier in the evening    Post-op Check    Subjective:  Pt offers no acute complaints at this time. Pain well controlled on current regiment. Denies chest pain, SOB, palpitations. In good spirits, CBI off , vargas output clear yellow urine, patient states " im pretty good, I want to get out of here" Post-op labs wnl    STATUS POST:  Laser enucleation of prostate     POST OPERATIVE DAY #: 0    MEDICATIONS  (STANDING):  allopurinol 300 milliGRAM(s) Oral daily  amLODIPine   Tablet 5 milliGRAM(s) Oral daily  ceFAZolin   IVPB 2000 milliGRAM(s) IV Intermittent Once  cephalexin 500 milliGRAM(s) Oral three times a day  enalapril 20 milliGRAM(s) Oral daily  furosemide   Injectable 10 milliGRAM(s) IV Push daily  heparin   Injectable 5000 Unit(s) SubCutaneous every 8 hours  lactated ringers. 1000 milliLiter(s) (75 mL/Hr) IV Continuous <Continuous>    MEDICATIONS  (PRN):  bisacodyl Suppository 10 milliGRAM(s) Rectal once PRN constiptiona  ondansetron Injectable 4 milliGRAM(s) IV Push every 6 hours PRN Nausea and/or Vomiting      Vital Signs Last 24 Hrs  T(C): 36.7 (06 Apr 2022 04:41), Max: 36.9 (05 Apr 2022 21:42)  T(F): 98.1 (06 Apr 2022 04:41), Max: 98.4 (05 Apr 2022 21:42)  HR: 91 (06 Apr 2022 04:41) (77 - 91)  BP: 147/72 (06 Apr 2022 04:41) (104/74 - 176/89)  BP(mean): 85 (05 Apr 2022 15:30) (77 - 97)  RR: 18 (06 Apr 2022 04:41) (10 - 19)  SpO2: 96% (06 Apr 2022 04:41) (95% - 100%)    Physical Exam:    Constitutional: NAD  HEENT: PERRL, EOMI  Neck: No JVD, FROM without pain  Respiratory: no accessory muscle use, respirations non-labored  GI: abdomen soft, non-tender, no guarding, no distention   Neurological: A&O x 3; without gross deficit    A:     P:  Continue current care  Pain control  OOB as tolerated  Encourage IS  DVT ppx  likely can remove cbi  ? possible discharge home today

## 2022-04-06 NOTE — DISCHARGE NOTE NURSING/CASE MANAGEMENT/SOCIAL WORK - PATIENT PORTAL LINK FT
You can access the FollowMyHealth Patient Portal offered by [facility] by registering at the following website: [email or web address]. By joining [employer]’s FollowMyHealth portal, you will also be able to view your health information using other applications (apps) compatible with our system.

## 2022-04-07 LAB — SURGICAL PATHOLOGY STUDY: SIGNIFICANT CHANGE UP

## 2022-04-08 ENCOUNTER — APPOINTMENT (OUTPATIENT)
Dept: UROLOGY | Facility: CLINIC | Age: 62
End: 2022-04-08
Payer: MEDICAID

## 2022-04-08 PROCEDURE — 99024 POSTOP FOLLOW-UP VISIT: CPT

## 2022-04-08 RX ORDER — APIXABAN 2.5 MG/1
2 TABLET, FILM COATED ORAL
Qty: 4 | Refills: 0
Start: 2022-04-08 | End: 2022-04-08

## 2022-04-08 RX ORDER — APIXABAN 2.5 MG/1
2 TABLET, FILM COATED ORAL
Qty: 0 | Refills: 0 | DISCHARGE
Start: 2022-04-08

## 2022-04-08 NOTE — HISTORY OF PRESENT ILLNESS
[FreeTextEntry1] : \par \par s/p laser enucleation of the prostate\par known large capacity bladder, was on CIC\par here for trial of void\par \par filled 200 ml and vargas removed\par immediately voided 200 ml with good stream and ease\par feeling well\par \par instructed to time void every 2-3 hours and update if he is unable to void\par will schedule appointment in 2 weeks for uroflow and PVR\par can start eliquis today - urine is clear\par \par next visit will decide on removal of IVC filter

## 2022-04-18 ENCOUNTER — APPOINTMENT (OUTPATIENT)
Dept: ORTHOPEDIC SURGERY | Facility: CLINIC | Age: 62
End: 2022-04-18
Payer: MEDICAID

## 2022-04-18 ENCOUNTER — NON-APPOINTMENT (OUTPATIENT)
Age: 62
End: 2022-04-18

## 2022-04-18 VITALS
SYSTOLIC BLOOD PRESSURE: 133 MMHG | WEIGHT: 195 LBS | DIASTOLIC BLOOD PRESSURE: 82 MMHG | BODY MASS INDEX: 25.84 KG/M2 | HEART RATE: 79 BPM | HEIGHT: 73 IN

## 2022-04-18 PROCEDURE — 99203 OFFICE O/P NEW LOW 30 MIN: CPT

## 2022-04-20 ENCOUNTER — APPOINTMENT (OUTPATIENT)
Dept: UROLOGY | Facility: CLINIC | Age: 62
End: 2022-04-20
Payer: MEDICAID

## 2022-04-20 ENCOUNTER — APPOINTMENT (OUTPATIENT)
Dept: UROLOGY | Facility: CLINIC | Age: 62
End: 2022-04-20

## 2022-04-20 PROCEDURE — 99024 POSTOP FOLLOW-UP VISIT: CPT

## 2022-04-20 NOTE — HISTORY OF PRESENT ILLNESS
[FreeTextEntry1] : 4/5/2022 laser enucleation of the prostate with morcellation\par pathology - no malignancy \par here for follow up \par \par voiding well, good stream, no complains.\par PVR 8 ml\par urine clear, already started anticoagulation\par mentions mild stress incontinence \par doing kegel exercises\par \par plan:\par 1. arrange for removal of IVC filter\par 2. next visit in 3 months with new PSA, can be with Dr. Gonzales\par 3. after that will only need annual routine visits

## 2022-04-20 NOTE — ASSESSMENT
[FreeTextEntry1] : \par plan:\par 1. arrange for removal of IVC filter\par 2. next visit in 3 months with new PSA, can be with Dr. Gonzales\par 3. after that will only need annual routine visits

## 2022-04-28 ENCOUNTER — NON-APPOINTMENT (OUTPATIENT)
Age: 62
End: 2022-04-28

## 2022-05-15 ENCOUNTER — NON-APPOINTMENT (OUTPATIENT)
Age: 62
End: 2022-05-15

## 2022-05-16 RX ORDER — SILODOSIN 4 MG/1
1 CAPSULE ORAL
Qty: 0 | Refills: 0 | DISCHARGE

## 2022-05-16 RX ORDER — ALLOPURINOL 300 MG
1 TABLET ORAL
Qty: 0 | Refills: 0 | DISCHARGE

## 2022-05-16 NOTE — ASU PATIENT PROFILE, ADULT - NSICDXPASTMEDICALHX_GEN_ALL_CORE_FT
PAST MEDICAL HISTORY:  BPH (benign prostatic hyperplasia)     Cervical herniated disc     DVT, lower extremity     Enlarged prostate with lower urinary tract symptoms (LUTS)     Gout     Hypertension     Pulmonary embolism     Rotator cuff tear, right      PAST MEDICAL HISTORY:  BPH (benign prostatic hyperplasia)     Cervical herniated disc     DVT, lower extremity     Enlarged prostate with lower urinary tract symptoms (LUTS)     Factor V Leiden     Gout     Hypertension     Pulmonary embolism     Rotator cuff tear, right

## 2022-05-16 NOTE — ASU PATIENT PROFILE, ADULT - FALL HARM RISK - RISK INTERVENTIONS

## 2022-05-16 NOTE — ASU PATIENT PROFILE, ADULT - NSICDXPASTSURGICALHX_GEN_ALL_CORE_FT
PAST SURGICAL HISTORY:  History of prostate surgery     Presence of IVC filter     S/P right knee arthroscopy

## 2022-05-17 ENCOUNTER — OUTPATIENT (OUTPATIENT)
Dept: INPATIENT UNIT | Facility: HOSPITAL | Age: 62
LOS: 1 days | Discharge: HOME | End: 2022-05-17
Payer: MEDICAID

## 2022-05-17 ENCOUNTER — TRANSCRIPTION ENCOUNTER (OUTPATIENT)
Age: 62
End: 2022-05-17

## 2022-05-17 ENCOUNTER — RESULT REVIEW (OUTPATIENT)
Age: 62
End: 2022-05-17

## 2022-05-17 VITALS
DIASTOLIC BLOOD PRESSURE: 87 MMHG | RESPIRATION RATE: 16 BRPM | SYSTOLIC BLOOD PRESSURE: 141 MMHG | TEMPERATURE: 97 F | HEART RATE: 78 BPM | OXYGEN SATURATION: 100 %

## 2022-05-17 VITALS
OXYGEN SATURATION: 99 % | TEMPERATURE: 97 F | HEART RATE: 73 BPM | WEIGHT: 190.04 LBS | RESPIRATION RATE: 16 BRPM | DIASTOLIC BLOOD PRESSURE: 94 MMHG | SYSTOLIC BLOOD PRESSURE: 152 MMHG | HEIGHT: 73 IN

## 2022-05-17 DIAGNOSIS — N40.0 BENIGN PROSTATIC HYPERPLASIA WITHOUT LOWER URINARY TRACT SYMPTOMS: ICD-10-CM

## 2022-05-17 DIAGNOSIS — Z45.89 ENCOUNTER FOR ADJUSTMENT AND MANAGEMENT OF OTHER IMPLANTED DEVICES: ICD-10-CM

## 2022-05-17 DIAGNOSIS — Z98.890 OTHER SPECIFIED POSTPROCEDURAL STATES: Chronic | ICD-10-CM

## 2022-05-17 DIAGNOSIS — Z95.828 PRESENCE OF OTHER VASCULAR IMPLANTS AND GRAFTS: Chronic | ICD-10-CM

## 2022-05-17 LAB
ANION GAP SERPL CALC-SCNC: 5 MMOL/L — SIGNIFICANT CHANGE UP (ref 5–17)
BUN SERPL-MCNC: 18 MG/DL — SIGNIFICANT CHANGE UP (ref 7–23)
CALCIUM SERPL-MCNC: 9.5 MG/DL — SIGNIFICANT CHANGE UP (ref 8.5–10.1)
CHLORIDE SERPL-SCNC: 105 MMOL/L — SIGNIFICANT CHANGE UP (ref 96–108)
CO2 SERPL-SCNC: 28 MMOL/L — SIGNIFICANT CHANGE UP (ref 22–31)
CREAT SERPL-MCNC: 1.11 MG/DL — SIGNIFICANT CHANGE UP (ref 0.5–1.3)
EGFR: 75 ML/MIN/1.73M2 — SIGNIFICANT CHANGE UP
GLUCOSE SERPL-MCNC: 98 MG/DL — SIGNIFICANT CHANGE UP (ref 70–99)
HCT VFR BLD CALC: 40 % — SIGNIFICANT CHANGE UP (ref 39–50)
HGB BLD-MCNC: 14.2 G/DL — SIGNIFICANT CHANGE UP (ref 13–17)
INR BLD: 1.08 RATIO — SIGNIFICANT CHANGE UP (ref 0.88–1.16)
MCHC RBC-ENTMCNC: 34.1 PG — HIGH (ref 27–34)
MCHC RBC-ENTMCNC: 35.5 GM/DL — SIGNIFICANT CHANGE UP (ref 32–36)
MCV RBC AUTO: 96.2 FL — SIGNIFICANT CHANGE UP (ref 80–100)
PLATELET # BLD AUTO: 206 K/UL — SIGNIFICANT CHANGE UP (ref 150–400)
POTASSIUM SERPL-MCNC: 4.1 MMOL/L — SIGNIFICANT CHANGE UP (ref 3.5–5.3)
POTASSIUM SERPL-SCNC: 4.1 MMOL/L — SIGNIFICANT CHANGE UP (ref 3.5–5.3)
PROTHROM AB SERPL-ACNC: 12.5 SEC — SIGNIFICANT CHANGE UP (ref 10.5–13.4)
RBC # BLD: 4.16 M/UL — LOW (ref 4.2–5.8)
RBC # FLD: 12.2 % — SIGNIFICANT CHANGE UP (ref 10.3–14.5)
SODIUM SERPL-SCNC: 138 MMOL/L — SIGNIFICANT CHANGE UP (ref 135–145)
WBC # BLD: 6.34 K/UL — SIGNIFICANT CHANGE UP (ref 3.8–10.5)
WBC # FLD AUTO: 6.34 K/UL — SIGNIFICANT CHANGE UP (ref 3.8–10.5)

## 2022-05-17 PROCEDURE — 36415 COLL VENOUS BLD VENIPUNCTURE: CPT

## 2022-05-17 PROCEDURE — C1769: CPT

## 2022-05-17 PROCEDURE — C1887: CPT

## 2022-05-17 PROCEDURE — 93005 ELECTROCARDIOGRAM TRACING: CPT

## 2022-05-17 PROCEDURE — C1894: CPT

## 2022-05-17 PROCEDURE — 85610 PROTHROMBIN TIME: CPT

## 2022-05-17 PROCEDURE — C1773: CPT

## 2022-05-17 PROCEDURE — 85027 COMPLETE CBC AUTOMATED: CPT

## 2022-05-17 PROCEDURE — 37193 REM ENDOVAS VENA CAVA FILTER: CPT

## 2022-05-17 PROCEDURE — 93010 ELECTROCARDIOGRAM REPORT: CPT

## 2022-05-17 PROCEDURE — 80048 BASIC METABOLIC PNL TOTAL CA: CPT

## 2022-05-17 RX ORDER — AMLODIPINE BESYLATE 2.5 MG/1
1 TABLET ORAL
Qty: 0 | Refills: 0 | DISCHARGE

## 2022-05-17 RX ORDER — ACETAMINOPHEN 500 MG
1000 TABLET ORAL ONCE
Refills: 0 | Status: DISCONTINUED | OUTPATIENT
Start: 2022-05-17 | End: 2022-05-17

## 2022-05-17 RX ORDER — FINASTERIDE 5 MG/1
1 TABLET, FILM COATED ORAL
Qty: 0 | Refills: 0 | DISCHARGE

## 2022-05-17 RX ORDER — ALLOPURINOL 300 MG
1 TABLET ORAL
Qty: 0 | Refills: 0 | DISCHARGE

## 2022-05-17 RX ORDER — SODIUM CHLORIDE 9 MG/ML
1000 INJECTION INTRAMUSCULAR; INTRAVENOUS; SUBCUTANEOUS
Refills: 0 | Status: DISCONTINUED | OUTPATIENT
Start: 2022-05-17 | End: 2022-05-17

## 2022-05-17 RX ORDER — ALLOPURINOL 300 MG
3 TABLET ORAL
Qty: 0 | Refills: 0 | DISCHARGE

## 2022-05-17 RX ORDER — ACETAMINOPHEN 500 MG
2 TABLET ORAL
Qty: 0 | Refills: 0 | DISCHARGE

## 2022-05-17 RX ORDER — ONDANSETRON 8 MG/1
4 TABLET, FILM COATED ORAL EVERY 6 HOURS
Refills: 0 | Status: DISCONTINUED | OUTPATIENT
Start: 2022-05-17 | End: 2022-05-17

## 2022-05-17 RX ORDER — ALPRAZOLAM 0.25 MG
0 TABLET ORAL
Qty: 0 | Refills: 0 | DISCHARGE

## 2022-05-17 RX ORDER — OXYCODONE HYDROCHLORIDE 5 MG/1
5 TABLET ORAL ONCE
Refills: 0 | Status: DISCONTINUED | OUTPATIENT
Start: 2022-05-17 | End: 2022-05-17

## 2022-05-17 NOTE — ASU DISCHARGE PLAN (ADULT/PEDIATRIC) - NS MD DC FALL RISK RISK
For information on Fall & Injury Prevention, visit: [email or web address] OR  [email or web address] OR  https://www.cdc.gov/steadi/patient.html

## 2022-06-03 ENCOUNTER — APPOINTMENT (OUTPATIENT)
Dept: INTERNAL MEDICINE | Facility: CLINIC | Age: 62
End: 2022-06-03
Payer: MEDICAID

## 2022-06-03 VITALS
RESPIRATION RATE: 16 BRPM | WEIGHT: 195 LBS | OXYGEN SATURATION: 97 % | HEIGHT: 73 IN | SYSTOLIC BLOOD PRESSURE: 125 MMHG | BODY MASS INDEX: 25.84 KG/M2 | TEMPERATURE: 98.1 F | HEART RATE: 78 BPM | DIASTOLIC BLOOD PRESSURE: 80 MMHG

## 2022-06-03 DIAGNOSIS — Z90.79 ACQUIRED ABSENCE OF OTHER GENITAL ORGAN(S): ICD-10-CM

## 2022-06-03 DIAGNOSIS — Z87.448 PERSONAL HISTORY OF OTHER DISEASES OF URINARY SYSTEM: ICD-10-CM

## 2022-06-03 DIAGNOSIS — Z87.898 PERSONAL HISTORY OF OTHER SPECIFIED CONDITIONS: ICD-10-CM

## 2022-06-03 PROCEDURE — 99214 OFFICE O/P EST MOD 30 MIN: CPT

## 2022-06-03 RX ORDER — AMOXICILLIN AND CLAVULANATE POTASSIUM 875; 125 MG/1; MG/1
875-125 TABLET, COATED ORAL
Qty: 14 | Refills: 0 | Status: DISCONTINUED | COMMUNITY
Start: 2022-03-31 | End: 2022-06-03

## 2022-06-03 RX ORDER — CEPHALEXIN 500 MG/1
500 CAPSULE ORAL
Qty: 9 | Refills: 0 | Status: DISCONTINUED | COMMUNITY
Start: 2022-04-06 | End: 2022-06-03

## 2022-06-03 RX ORDER — SILODOSIN 8 MG/1
8 CAPSULE ORAL
Qty: 90 | Refills: 1 | Status: DISCONTINUED | COMMUNITY
Start: 2021-10-13 | End: 2022-06-03

## 2022-06-03 RX ORDER — ALLOPURINOL 100 MG/1
100 TABLET ORAL
Qty: 30 | Refills: 0 | Status: DISCONTINUED | COMMUNITY
Start: 2021-11-04 | End: 2022-06-03

## 2022-06-03 RX ORDER — FINASTERIDE 5 MG/1
5 TABLET, FILM COATED ORAL DAILY
Qty: 90 | Refills: 1 | Status: DISCONTINUED | COMMUNITY
Start: 2021-10-13 | End: 2022-06-03

## 2022-06-03 NOTE — HISTORY OF PRESENT ILLNESS
[FreeTextEntry1] : followup [de-identified] : Mr. Fleming presents for followup evaluation. He is a 61-year-old male with a history of bilateral pulmonary emboli. He has backed up 5 Leiden deficiency. Patient underwent TURP on 4/5/22. Preoperatively he had an IVC filter placed. His anticoagulation was discontinued for approximately 24 hours pre-surgery and started on the night of the surgery. IVC filter was removed on 5/17/18. Mr. Fleming is back on eliquis 5 mg p.o. b.i.d. He is no longer having any difficulty urinating since surgery.

## 2022-06-03 NOTE — PLAN
[FreeTextEntry1] : Mr. Fleming presents for followup evaluation.\par \par #1. He is status post TURP. He had a retrievable IVC filter placed prior to surgery. I felt has been removed.\par \par #2. Patient has history of DVT/bilateral pulmonary emboli. He has factor V Leiden deficiency and is on chronic anticoagulation with eliquis 5 mg p.o. b.i.d.\par \par #3. Patient states that he has several herniated discs in his neck and may require cervical spine surgery. He will followup with his orthopedic surgeon and then discuss plans with me. There is a possibility of having sequential surgery. Also, the patient has a torn rotator cuff of the right shoulder which may require surgery as well. Patient may be candidate for a permanent IVC filter.\par \par #4. Followup in 6 months.

## 2022-06-04 PROBLEM — D68.51 ACTIVATED PROTEIN C RESISTANCE: Chronic | Status: ACTIVE | Noted: 2022-05-17

## 2022-06-04 PROBLEM — N40.0 BENIGN PROSTATIC HYPERPLASIA WITHOUT LOWER URINARY TRACT SYMPTOMS: Chronic | Status: ACTIVE | Noted: 2022-05-16

## 2022-07-21 LAB
PSA FREE FLD-MCNC: 22 %
PSA FREE SERPL-MCNC: 0.24 NG/ML
PSA SERPL-MCNC: 1.1 NG/ML

## 2022-07-27 ENCOUNTER — APPOINTMENT (OUTPATIENT)
Dept: UROLOGY | Facility: CLINIC | Age: 62
End: 2022-07-27

## 2022-07-27 DIAGNOSIS — N40.1 BENIGN PROSTATIC HYPERPLASIA WITH LOWER URINARY TRACT SYMPMS: ICD-10-CM

## 2022-07-27 DIAGNOSIS — N13.8 BENIGN PROSTATIC HYPERPLASIA WITH LOWER URINARY TRACT SYMPMS: ICD-10-CM

## 2022-07-27 PROCEDURE — 99213 OFFICE O/P EST LOW 20 MIN: CPT

## 2022-07-27 NOTE — HISTORY OF PRESENT ILLNESS
[FreeTextEntry1] : 63 yo M for follow up\par 4/2022 LEP\par restarted his anticoagulation, and IVC filter removed\par feeling well\par voiding comfortably\par new PSA 1.1\par \par no need for further work up at this point\par can follow up with Dr. Gonzales\par recommend follow up in 1 year only

## 2022-08-07 ENCOUNTER — RX RENEWAL (OUTPATIENT)
Age: 62
End: 2022-08-07

## 2022-08-12 NOTE — ASU DISCHARGE PLAN (ADULT/PEDIATRIC) - ASU DC SPECIAL INSTRUCTIONSFT
Call Interventional Radiology to schedule IVC filter removal when ready for removal after surgery Normal vision: sees adequately in most situations; can see medication labels, newsprint

## 2022-09-27 ENCOUNTER — RX RENEWAL (OUTPATIENT)
Age: 62
End: 2022-09-27

## 2022-10-03 ENCOUNTER — APPOINTMENT (OUTPATIENT)
Dept: INTERNAL MEDICINE | Facility: CLINIC | Age: 62
End: 2022-10-03

## 2022-10-03 VITALS
WEIGHT: 209 LBS | HEIGHT: 73 IN | SYSTOLIC BLOOD PRESSURE: 146 MMHG | HEART RATE: 82 BPM | DIASTOLIC BLOOD PRESSURE: 90 MMHG | TEMPERATURE: 97.7 F | OXYGEN SATURATION: 98 % | BODY MASS INDEX: 27.7 KG/M2

## 2022-10-03 DIAGNOSIS — M54.2 CERVICALGIA: ICD-10-CM

## 2022-10-03 DIAGNOSIS — Z01.818 ENCOUNTER FOR OTHER PREPROCEDURAL EXAMINATION: ICD-10-CM

## 2022-10-03 PROCEDURE — 99215 OFFICE O/P EST HI 40 MIN: CPT

## 2022-10-03 RX ORDER — COLCHICINE 0.6 MG/1
0.6 TABLET ORAL TWICE DAILY
Qty: 30 | Refills: 1 | Status: DISCONTINUED | COMMUNITY
Start: 2021-09-22 | End: 2022-10-03

## 2022-10-03 NOTE — ASSESSMENT
[Patient Requires Further Testing] : Patient requires further testing [Modify anticoagulant treatment prior to procedure] : Modify anticoagulant treatment prior to procedure [Continue medications as is] : Continue current medications [FreeTextEntry4] : Mr. Fleming presents for preoperative evaluation for scheduled anterior cervical disc fusion.  He has a history of DVT/pulmonary embolism with factor V Leiden deficiency.  There is no contraindication to planned anterior cervical disc fusion surgery with sedation/anesthesia pending review of preoperative lab work by anesthesia.  Patient will discontinue Eliquis 72 hours prior to surgery as per hematology.  Eliquis should be restarted postoperatively as soon as deemed appropriate by orthopedic surgery.  Hematology evaluation has been provided by Dr. Jaswinder Cano. [FreeTextEntry5] : Discontinue Eliquis 72 hours prior to surgery

## 2022-10-03 NOTE — HISTORY OF PRESENT ILLNESS
[No Pertinent Cardiac History] : no history of aortic stenosis, atrial fibrillation, coronary artery disease, recent myocardial infarction, or implantable device/pacemaker [No Pertinent Pulmonary History] : no history of asthma, COPD, sleep apnea, or smoking [No Adverse Anesthesia Reaction] : no adverse anesthesia reaction in self or family member [Chronic Anticoagulation] : no chronic anticoagulation [Chronic Kidney Disease] : no chronic kidney disease [Diabetes] : no diabetes [FreeTextEntry1] : Anterior cervical disc fusion [FreeTextEntry2] : 10/26/2022 [FreeTextEntry3] : Dr. Donato Walters [FreeTextEntry4] : Mr. Fleming presents for preoperative evaluation for scheduled anterior cervical disc fusion on 10/26/2022 at [facility].  Patient has a history of DVT and bilateral pulmonary emboli diagnosed on 10/7/2021.  Hemologic work-up showed factor V Leiden deficiency.  Patient was started on Eliquis which she is continued on.  Mr. Fleming did have prostate surgery on 4/22/2022 which anticoagulation was discontinued 3 days prior to surgery.  At that time he did have an inferior vena cava filter placed 2 weeks prior to surgery which was removed 2 weeks postsurgery.  Currently, patient denies any history of leg swelling or shortness of breath.

## 2022-10-18 ENCOUNTER — RX RENEWAL (OUTPATIENT)
Age: 62
End: 2022-10-18

## 2022-10-26 LAB
INR PPP: 1
PT BLD: 11

## 2022-12-13 ENCOUNTER — RX RENEWAL (OUTPATIENT)
Age: 62
End: 2022-12-13

## 2022-12-26 ENCOUNTER — RX RENEWAL (OUTPATIENT)
Age: 62
End: 2022-12-26

## 2023-01-16 ENCOUNTER — OFFICE (OUTPATIENT)
Dept: URBAN - METROPOLITAN AREA CLINIC 102 | Facility: CLINIC | Age: 63
Setting detail: OPHTHALMOLOGY
End: 2023-01-16
Payer: MEDICAID

## 2023-01-16 DIAGNOSIS — H25.13: ICD-10-CM

## 2023-01-16 DIAGNOSIS — H40.023: ICD-10-CM

## 2023-01-16 PROCEDURE — 92250 FUNDUS PHOTOGRAPHY W/I&R: CPT | Performed by: OPHTHALMOLOGY

## 2023-01-16 PROCEDURE — 92004 COMPRE OPH EXAM NEW PT 1/>: CPT | Performed by: OPHTHALMOLOGY

## 2023-01-16 PROCEDURE — 92083 EXTENDED VISUAL FIELD XM: CPT | Performed by: OPHTHALMOLOGY

## 2023-01-16 PROCEDURE — 92020 GONIOSCOPY: CPT | Performed by: OPHTHALMOLOGY

## 2023-01-16 PROCEDURE — 76514 ECHO EXAM OF EYE THICKNESS: CPT | Performed by: OPHTHALMOLOGY

## 2023-01-16 ASSESSMENT — TONOMETRY
OD_IOP_MMHG: 11
OS_IOP_MMHG: 10

## 2023-01-16 ASSESSMENT — PACHYMETRY
OD_CT_CORRECTION: 0
OS_CT_UM: 559
OS_CT_CORRECTION: -1
OD_CT_UM: 545

## 2023-01-16 ASSESSMENT — KERATOMETRY
OD_AXISANGLE_DEGREES: 053
OS_K1POWER_DIOPTERS: 40.75
OD_K1POWER_DIOPTERS: 40.25
OS_K2POWER_DIOPTERS: 41.25
OS_AXISANGLE_DEGREES: 113
OD_K2POWER_DIOPTERS: 41.00

## 2023-01-16 ASSESSMENT — REFRACTION_AUTOREFRACTION
OS_CYLINDER: -0.50
OS_SPHERE: +1.00
OS_AXIS: 035
OD_SPHERE: -1.00
OD_CYLINDER: -1.25
OD_AXIS: 078

## 2023-01-16 ASSESSMENT — SPHEQUIV_DERIVED
OS_SPHEQUIV: 0.75
OD_SPHEQUIV: -1.625

## 2023-01-16 ASSESSMENT — AXIALLENGTH_DERIVED
OS_AL: 24.235
OD_AL: 25.4113

## 2023-01-16 ASSESSMENT — CONFRONTATIONAL VISUAL FIELD TEST (CVF)
OD_FINDINGS: FULL
OS_FINDINGS: FULL

## 2023-01-16 ASSESSMENT — VISUAL ACUITY
OD_BCVA: 20/20
OS_BCVA: 20/60-2

## 2023-02-21 ENCOUNTER — RX RENEWAL (OUTPATIENT)
Age: 63
End: 2023-02-21

## 2023-02-22 ENCOUNTER — APPOINTMENT (OUTPATIENT)
Dept: INTERNAL MEDICINE | Facility: CLINIC | Age: 63
End: 2023-02-22

## 2023-03-01 ENCOUNTER — OFFICE (OUTPATIENT)
Dept: URBAN - METROPOLITAN AREA CLINIC 102 | Facility: CLINIC | Age: 63
Setting detail: OPHTHALMOLOGY
End: 2023-03-01
Payer: MEDICAID

## 2023-03-01 DIAGNOSIS — H43.392: ICD-10-CM

## 2023-03-01 DIAGNOSIS — H40.023: ICD-10-CM

## 2023-03-01 DIAGNOSIS — H25.13: ICD-10-CM

## 2023-03-01 PROCEDURE — 92083 EXTENDED VISUAL FIELD XM: CPT | Performed by: OPHTHALMOLOGY

## 2023-03-01 PROCEDURE — 92133 CPTRZD OPH DX IMG PST SGM ON: CPT | Performed by: OPHTHALMOLOGY

## 2023-03-01 PROCEDURE — 92201 OPSCPY EXTND RTA DRAW UNI/BI: CPT | Performed by: OPHTHALMOLOGY

## 2023-03-01 PROCEDURE — 92012 INTRM OPH EXAM EST PATIENT: CPT | Performed by: OPHTHALMOLOGY

## 2023-03-01 ASSESSMENT — CONFRONTATIONAL VISUAL FIELD TEST (CVF)
OD_FINDINGS: FULL
OS_FINDINGS: FULL

## 2023-03-01 ASSESSMENT — TONOMETRY
OD_IOP_MMHG: 15
OS_IOP_MMHG: 13

## 2023-03-01 ASSESSMENT — REFRACTION_AUTOREFRACTION
OS_SPHERE: +1.25
OS_CYLINDER: -0.50
OD_SPHERE: -1.00
OD_CYLINDER: -1.50
OD_AXIS: 065
OS_AXIS: 033

## 2023-03-01 ASSESSMENT — AXIALLENGTH_DERIVED
OS_AL: 24.1327
OD_AL: 25.4679

## 2023-03-01 ASSESSMENT — VISUAL ACUITY
OD_BCVA: 20/20
OS_BCVA: 20/60-2

## 2023-03-01 ASSESSMENT — PACHYMETRY
OD_CT_UM: 545
OS_CT_UM: 559
OD_CT_CORRECTION: 0
OS_CT_CORRECTION: -1

## 2023-03-01 ASSESSMENT — SPHEQUIV_DERIVED
OS_SPHEQUIV: 1
OD_SPHEQUIV: -1.75

## 2023-03-01 ASSESSMENT — KERATOMETRY
OS_AXISANGLE_DEGREES: 109
OD_K1POWER_DIOPTERS: 40.25
OD_K2POWER_DIOPTERS: 41.00
OS_K2POWER_DIOPTERS: 41.25
OS_K1POWER_DIOPTERS: 40.75
OD_AXISANGLE_DEGREES: 053

## 2023-03-06 ENCOUNTER — OFFICE (OUTPATIENT)
Dept: URBAN - METROPOLITAN AREA CLINIC 102 | Facility: CLINIC | Age: 63
Setting detail: OPHTHALMOLOGY
End: 2023-03-06
Payer: MEDICAID

## 2023-03-06 DIAGNOSIS — H40.023: ICD-10-CM

## 2023-03-06 DIAGNOSIS — H25.13: ICD-10-CM

## 2023-03-06 DIAGNOSIS — H43.392: ICD-10-CM

## 2023-03-06 PROCEDURE — 99214 OFFICE O/P EST MOD 30 MIN: CPT | Performed by: OPHTHALMOLOGY

## 2023-03-06 ASSESSMENT — VISUAL ACUITY
OS_BCVA: 20/80
OD_BCVA: 20/20

## 2023-03-06 ASSESSMENT — KERATOMETRY
OD_AXISANGLE_DEGREES: 057
OS_K2POWER_DIOPTERS: 41.75
OD_K2POWER_DIOPTERS: 41.25
OS_AXISANGLE_DEGREES: 109
OS_K1POWER_DIOPTERS: 41.25
OD_K1POWER_DIOPTERS: 40.25

## 2023-03-06 ASSESSMENT — PACHYMETRY
OS_CT_UM: 559
OD_CT_CORRECTION: 0
OS_CT_CORRECTION: -1
OD_CT_UM: 545

## 2023-03-06 ASSESSMENT — SPHEQUIV_DERIVED
OD_SPHEQUIV: -1.5
OD_SPHEQUIV: -1.75
OS_SPHEQUIV: 0.75

## 2023-03-06 ASSESSMENT — REFRACTION_MANIFEST
OD_AXIS: 90
OD_SPHERE: -1.00
OD_CYLINDER: -1.00
OD_VA1: 20/40

## 2023-03-06 ASSESSMENT — REFRACTION_AUTOREFRACTION
OS_SPHERE: +1.00
OD_SPHERE: -1.25
OS_CYLINDER: -0.50
OD_AXIS: 083
OS_AXIS: 027
OD_CYLINDER: -1.00

## 2023-03-06 ASSESSMENT — TONOMETRY
OD_IOP_MMHG: 16
OS_IOP_MMHG: 15

## 2023-03-06 ASSESSMENT — AXIALLENGTH_DERIVED
OD_AL: 25.4145
OS_AL: 24.0428
OD_AL: 25.302

## 2023-03-06 ASSESSMENT — CONFRONTATIONAL VISUAL FIELD TEST (CVF)
OS_FINDINGS: FULL
OD_FINDINGS: FULL

## 2023-04-04 ENCOUNTER — RX RENEWAL (OUTPATIENT)
Age: 63
End: 2023-04-04

## 2023-04-11 ENCOUNTER — OFFICE (OUTPATIENT)
Dept: URBAN - METROPOLITAN AREA CLINIC 102 | Facility: CLINIC | Age: 63
Setting detail: OPHTHALMOLOGY
End: 2023-04-11
Payer: MEDICAID

## 2023-04-11 DIAGNOSIS — H25.11: ICD-10-CM

## 2023-04-11 DIAGNOSIS — H40.023: ICD-10-CM

## 2023-04-11 PROCEDURE — 99213 OFFICE O/P EST LOW 20 MIN: CPT | Performed by: OPHTHALMOLOGY

## 2023-04-11 PROCEDURE — 92136 OPHTHALMIC BIOMETRY: CPT | Performed by: OPHTHALMOLOGY

## 2023-04-11 ASSESSMENT — TONOMETRY
OD_IOP_MMHG: 14
OS_IOP_MMHG: 14

## 2023-04-11 ASSESSMENT — AXIALLENGTH_DERIVED
OS_AL: 24.1867
OD_AL: 25.4712
OD_AL: 25.302

## 2023-04-11 ASSESSMENT — KERATOMETRY
OS_K1POWER_DIOPTERS: 40.75
METHOD_AUTO_MANUAL: AUTO
OD_K1POWER_DIOPTERS: 40.25
OS_K1POWER_DIOPTERS: 40.75
OD_K2POWER_DIOPTERS: 41.25
OD_AXISANGLE2_DEGREES: 047
OS_AXISANGLE_DEGREES: 21
OS_K2POWER_DIOPTERS: 41.50
OD_CYLAXISANGLE_DEGREES: 047
OD_K2POWER_DIOPTERS: 41.25
OS_K1K2_AVERAGE: 41.125
OD_CYLPOWER_DEGREES: 1
OS_AXISANGLE2_DEGREES: 111
OS_CYLAXISANGLE_DEGREES: 111
OD_K1POWER_DIOPTERS: 40.25
OD_AXISANGLE_DEGREES: 047
OS_CYLPOWER_DEGREES: 0.75
OS_K2POWER_DIOPTERS: 41.50
OS_AXISANGLE_DEGREES: 111
OD_K1K2_AVERAGE: 40.75
OD_AXISANGLE_DEGREES: 137

## 2023-04-11 ASSESSMENT — VISUAL ACUITY
OD_BCVA: 20/25-2
OS_BCVA: 20/80-1

## 2023-04-11 ASSESSMENT — SPHEQUIV_DERIVED
OS_SPHEQUIV: 0.75
OD_SPHEQUIV: -1.875
OD_SPHEQUIV: -1.5

## 2023-04-11 ASSESSMENT — PACHYMETRY
OS_CT_UM: 559
OD_CT_UM: 545
OD_CT_CORRECTION: 0
OS_CT_CORRECTION: -1

## 2023-04-11 ASSESSMENT — REFRACTION_MANIFEST
OD_AXIS: 90
OD_CYLINDER: -1.00
OD_SPHERE: -1.00
OD_VA1: 20/40

## 2023-04-11 ASSESSMENT — REFRACTION_AUTOREFRACTION
OS_SPHERE: +1.00
OD_AXIS: 081
OS_CYLINDER: -0.50
OD_CYLINDER: -1.25
OD_SPHERE: -1.25
OS_AXIS: 033

## 2023-04-11 ASSESSMENT — CONFRONTATIONAL VISUAL FIELD TEST (CVF)
OD_FINDINGS: FULL
OS_FINDINGS: FULL

## 2023-04-24 ENCOUNTER — RX RENEWAL (OUTPATIENT)
Age: 63
End: 2023-04-24

## 2023-05-02 ENCOUNTER — APPOINTMENT (OUTPATIENT)
Dept: INTERNAL MEDICINE | Facility: CLINIC | Age: 63
End: 2023-05-02
Payer: MEDICAID

## 2023-05-02 VITALS
HEIGHT: 73 IN | OXYGEN SATURATION: 98 % | BODY MASS INDEX: 28.1 KG/M2 | TEMPERATURE: 97.7 F | WEIGHT: 212 LBS | HEART RATE: 75 BPM | SYSTOLIC BLOOD PRESSURE: 144 MMHG | DIASTOLIC BLOOD PRESSURE: 86 MMHG | RESPIRATION RATE: 16 BRPM

## 2023-05-02 DIAGNOSIS — Z78.9 OTHER SPECIFIED HEALTH STATUS: ICD-10-CM

## 2023-05-02 DIAGNOSIS — H26.9 UNSPECIFIED CATARACT: ICD-10-CM

## 2023-05-02 PROCEDURE — 99214 OFFICE O/P EST MOD 30 MIN: CPT

## 2023-05-02 NOTE — PLAN
[FreeTextEntry1] : 1. The patient was instructed to hold all ASA, Tylenol, Motrin, vitamins, supplements 7 days prior to surgery. May continue with eliquis BID qd\par \par 2. Close airway monitoring with pulse oximetry required\par \par 3. Patient with mildly elevated BP today. BP check required prior to start of surgery. If bp >150/90 may take additional 5mg of amlodipine. The patient will bring medication with him day of surgery.\par \par 4. The patient is medically cleared for procedure with no absolute contraindications\par \par \par \par

## 2023-05-02 NOTE — HISTORY OF PRESENT ILLNESS
[Chronic Anticoagulation] : chronic anticoagulation [(Patient denies any chest pain, claudication, dyspnea on exertion, orthopnea, palpitations or syncope)] : Patient denies any chest pain, claudication, dyspnea on exertion, orthopnea, palpitations or syncope [Aortic Stenosis] : no aortic stenosis [Atrial Fibrillation] : no atrial fibrillation [Coronary Artery Disease] : no coronary artery disease [Recent Myocardial Infarction] : no recent myocardial infarction [Implantable Device/Pacemaker] : no implantable device/pacemaker [Asthma] : no asthma [COPD] : no COPD [Sleep Apnea] : no sleep apnea [Smoker] : not a smoker [Family Member] : no family member with adverse anesthesia reaction/sudden death [Self] : no previous adverse anesthesia reaction [Chronic Kidney Disease] : no chronic kidney disease [Diabetes] : no diabetes [FreeTextEntry1] : R cataract sx [FreeTextEntry2] : 5/17/23 [FreeTextEntry3] : Dr. Lamb  [FreeTextEntry4] : Patient is a 62-year-old male with PMH of anxiety, BPH, Factor 5 deficiency, gout, h/o B/L PE presents to office today for MCA for R cataract surgery on 5/17/23 with Dr. Lamb. \par \par Patient reports he feels well today, no acute complaints.  [FreeTextEntry5] : on Eliquis 5mg BID qd [FreeTextEntry7] : Dr. Zhou - negative workup 10/2022

## 2023-05-17 ENCOUNTER — ASC (OUTPATIENT)
Dept: URBAN - METROPOLITAN AREA SURGERY 8 | Facility: SURGERY | Age: 63
Setting detail: OPHTHALMOLOGY
End: 2023-05-17
Payer: MEDICAID

## 2023-05-17 DIAGNOSIS — H25.11: ICD-10-CM

## 2023-05-17 DIAGNOSIS — H52.211: ICD-10-CM

## 2023-05-17 PROCEDURE — FEMTO FEMTOSECOND LASER: Performed by: OPHTHALMOLOGY

## 2023-05-17 PROCEDURE — 66984 XCAPSL CTRC RMVL W/O ECP: CPT | Performed by: OPHTHALMOLOGY

## 2023-05-18 ENCOUNTER — RX ONLY (RX ONLY)
Age: 63
End: 2023-05-18

## 2023-05-18 ENCOUNTER — OFFICE (OUTPATIENT)
Dept: URBAN - METROPOLITAN AREA CLINIC 102 | Facility: CLINIC | Age: 63
Setting detail: OPHTHALMOLOGY
End: 2023-05-18
Payer: MEDICAID

## 2023-05-18 DIAGNOSIS — Z96.1: ICD-10-CM

## 2023-05-18 PROCEDURE — 99024 POSTOP FOLLOW-UP VISIT: CPT | Performed by: OPHTHALMOLOGY

## 2023-05-18 ASSESSMENT — REFRACTION_AUTOREFRACTION
OS_SPHERE: +1.00
OS_CYLINDER: -0.25
OD_CYLINDER: -1.25
OD_AXIS: 135
OS_AXIS: 39
OD_SPHERE: -2.50

## 2023-05-18 ASSESSMENT — VISUAL ACUITY
OS_BCVA: 20/30
OD_BCVA: 20/20

## 2023-05-18 ASSESSMENT — PACHYMETRY
OD_CT_UM: 545
OS_CT_UM: 559
OS_CT_CORRECTION: -1
OD_CT_CORRECTION: 0

## 2023-05-18 ASSESSMENT — AXIALLENGTH_DERIVED
OS_AL: 24.1838
OD_AL: 24.8864
OD_AL: 25.6115

## 2023-05-18 ASSESSMENT — REFRACTION_MANIFEST
OD_CYLINDER: -1.00
OD_SPHERE: -1.00
OD_AXIS: 90
OD_VA1: 20/40

## 2023-05-18 ASSESSMENT — SPHEQUIV_DERIVED
OD_SPHEQUIV: -1.5
OS_SPHEQUIV: 0.875
OD_SPHEQUIV: -3.125

## 2023-05-18 ASSESSMENT — KERATOMETRY
METHOD_AUTO_MANUAL: AUTO
OS_AXISANGLE_DEGREES: 111
OD_AXISANGLE_DEGREES: 63
OD_K2POWER_DIOPTERS: 42.25
OD_K1POWER_DIOPTERS: 41.25
OS_K1POWER_DIOPTERS: 40.75
OS_K2POWER_DIOPTERS: 41.25

## 2023-05-18 ASSESSMENT — CONFRONTATIONAL VISUAL FIELD TEST (CVF)
OS_FINDINGS: FULL
OD_FINDINGS: FULL

## 2023-05-18 ASSESSMENT — CORNEAL EDEMA CLINICAL DESCRIPTION: OD_CORNEALEDEMA: 1+ CENTRAL

## 2023-05-18 ASSESSMENT — TONOMETRY
OD_IOP_MMHG: 19
OS_IOP_MMHG: 13

## 2023-05-25 ENCOUNTER — OFFICE (OUTPATIENT)
Dept: URBAN - METROPOLITAN AREA CLINIC 102 | Facility: CLINIC | Age: 63
Setting detail: OPHTHALMOLOGY
End: 2023-05-25

## 2023-05-25 DIAGNOSIS — Z96.1: ICD-10-CM

## 2023-05-25 PROCEDURE — 99024 POSTOP FOLLOW-UP VISIT: CPT | Performed by: OPHTHALMOLOGY

## 2023-05-25 ASSESSMENT — KERATOMETRY
OD_K2POWER_DIOPTERS: 41.00
OS_K1POWER_DIOPTERS: 40.75
OD_K1POWER_DIOPTERS: 40.25
OS_AXISANGLE_DEGREES: 112
METHOD_AUTO_MANUAL: AUTO
OD_AXISANGLE_DEGREES: 049
OS_K2POWER_DIOPTERS: 41.50

## 2023-05-25 ASSESSMENT — AXIALLENGTH_DERIVED
OD_AL: 24.9672
OS_AL: 24.0847
OD_AL: 25.3549

## 2023-05-25 ASSESSMENT — TONOMETRY
OS_IOP_MMHG: 15
OD_IOP_MMHG: 13

## 2023-05-25 ASSESSMENT — PACHYMETRY
OD_CT_CORRECTION: 0
OS_CT_UM: 559
OD_CT_UM: 545
OS_CT_CORRECTION: -1

## 2023-05-25 ASSESSMENT — REFRACTION_AUTOREFRACTION
OS_AXIS: 042
OS_SPHERE: +1.25
OD_SPHERE: 0.00
OS_CYLINDER: -0.50
OD_AXIS: 124
OD_CYLINDER: -1.25

## 2023-05-25 ASSESSMENT — REFRACTION_MANIFEST
OD_SPHERE: -1.00
OD_CYLINDER: -1.00
OD_VA1: 20/40
OD_AXIS: 90

## 2023-05-25 ASSESSMENT — CONFRONTATIONAL VISUAL FIELD TEST (CVF)
OS_FINDINGS: FULL
OD_FINDINGS: FULL

## 2023-05-25 ASSESSMENT — VISUAL ACUITY
OS_BCVA: 20/20
OD_BCVA: 20/25-2

## 2023-05-25 ASSESSMENT — SPHEQUIV_DERIVED
OS_SPHEQUIV: 1
OD_SPHEQUIV: -0.625
OD_SPHEQUIV: -1.5

## 2023-06-01 ENCOUNTER — OFFICE (OUTPATIENT)
Dept: URBAN - METROPOLITAN AREA CLINIC 102 | Facility: CLINIC | Age: 63
Setting detail: OPHTHALMOLOGY
End: 2023-06-01
Payer: MEDICARE

## 2023-06-01 DIAGNOSIS — H20.00: ICD-10-CM

## 2023-06-01 PROCEDURE — 99213 OFFICE O/P EST LOW 20 MIN: CPT | Performed by: OPHTHALMOLOGY

## 2023-06-01 ASSESSMENT — CONFRONTATIONAL VISUAL FIELD TEST (CVF)
OS_FINDINGS: FULL
OD_FINDINGS: FULL

## 2023-06-01 ASSESSMENT — KERATOMETRY
OS_AXISANGLE_DEGREES: 114
OS_K2POWER_DIOPTERS: 41.25
OD_AXISANGLE_DEGREES: 051
OD_K2POWER_DIOPTERS: 41.00
OS_K1POWER_DIOPTERS: 41.00
METHOD_AUTO_MANUAL: AUTO
OD_K1POWER_DIOPTERS: 40.00

## 2023-06-01 ASSESSMENT — AXIALLENGTH_DERIVED
OD_AL: 25.4081
OS_AL: 24.1867
OD_AL: 24.9641

## 2023-06-01 ASSESSMENT — SPHEQUIV_DERIVED
OS_SPHEQUIV: 0.75
OD_SPHEQUIV: -0.5
OD_SPHEQUIV: -1.5

## 2023-06-01 ASSESSMENT — REFRACTION_AUTOREFRACTION
OS_AXIS: 038
OD_SPHERE: 0.00
OS_CYLINDER: -0.50
OD_CYLINDER: -1.00
OD_AXIS: 124
OS_SPHERE: +1.00

## 2023-06-01 ASSESSMENT — VISUAL ACUITY
OD_BCVA: 20/25
OS_BCVA: 20/25

## 2023-06-01 ASSESSMENT — PACHYMETRY
OD_CT_CORRECTION: 0
OD_CT_UM: 545
OS_CT_UM: 559
OS_CT_CORRECTION: -1

## 2023-06-01 ASSESSMENT — REFRACTION_MANIFEST
OD_CYLINDER: -1.00
OD_SPHERE: -1.00
OD_AXIS: 90
OD_VA1: 20/40

## 2023-06-01 ASSESSMENT — TONOMETRY: OS_IOP_MMHG: 12

## 2023-06-05 ENCOUNTER — APPOINTMENT (OUTPATIENT)
Dept: ORTHOPEDIC SURGERY | Facility: CLINIC | Age: 63
End: 2023-06-05
Payer: MEDICARE

## 2023-06-05 PROCEDURE — 99214 OFFICE O/P EST MOD 30 MIN: CPT

## 2023-06-05 PROCEDURE — 73030 X-RAY EXAM OF SHOULDER: CPT | Mod: RT

## 2023-06-05 NOTE — PHYSICAL EXAM
[de-identified] : Constitutional: The general appearance of the patient is well developed, well nourished, no deformities and well groomed. Normal\par \par Gait: Gait and function is as follows: normal gait.\par \par Skin: Head and neck visualized skin is normal. Left upper extremity visualized skin is normal. Right upper extremity visualized skin is normal. Thoracic Skin of the thoracic spine shows visualized skin is normal.\par \par Cardiovascular: palpable radial pulse bilaterally, good capillary refill in digits of the bilateral upper extremities and no temperature or color changes in the bilateral upper extremities.\par \par Lymphatic: Normal Palpation of lymph nodes in the cervical.\par \par Neurologic: fine motor control in the bilateral upper extremities is intact. Deep Tendon Reflexes in Upper and Lower Extremities Negative Garcia's in the bilateral upper extremities. The patient is oriented to time, place and person. Sensation to light touch intact in the bilateral upper extremities. Mood and Affect is normal.\par \par Right Shoulder: Inspection of the shoulder/upper arm is as follows: no scapula winging, no biceps deformity and no AC joint deformity. Palpation of the shoulder/upper arm is as follows: There is tenderness at the proximal biceps tendon. Range of motion of the shoulder is as follows: Pain with internal rotation, external rotation, abduction and forward flexion. Strength of the shoulder is as follows: Supraspinatus 4/5. External Rotation 4/5. Internal Rotation 4/5. Deltoid 5/5 Ligament Stability and Special Tests of the shoulder is as follows: Neer test is positive. Desouza' test is positive. Speed's test is positive\par \par Left Shoulder: Inspection of the shoulder/upper arm is as follows: There is a full, pain-free, stable range of motion of the shoulder with normal strength and no tenderness to palpation.\par \par Neck:\par \par Inspection / Palpation of the cervical spine is as follows: mild paracervical tenderness. Range of motion of the cervical spine is as follows: moderately decreased range of motion of the cervical spine. Stability testing for the cervical spine is as follows Stable range of motion.\par \par Back, including spine: Inspection / Palpation of the thoracic/lumbar spine is as follows: There is a full, pain free, stable range of motion of the thoracic spine with a normal tone and not tenderness to palpation..\par

## 2023-06-05 NOTE — DISCUSSION/SUMMARY
[de-identified] : This is a 63 yo male presenting to the office c/o ongoing right  shoulder pain x months\par Pain is minimal but overhead motion aggravates the pain\par Updated MRI recommended to further evaluate rotator cuff \par Would consider repair with patch or balloon augmentation versus reverse arthroplasty.\par Follow up after MRI \par \par Based on the patient’s history and physical exam findings, I am concerned about the possibility of a full-thickness rotator cuff tear. The patient has pain and subjective weakness consistent with this diagnosis. Therefore, I recommend an MRI to evaluate for a rotator cuff tear. This will also aid in evaluating for injury to the biceps labral complex and for any signs of impingement. The patient will follow-up after MRI to discuss further treatment options.\par \par \par \par (1) We discussed a comprehensive treatment plans that included a prescription management plan involving the use of prescription strength medications to include Ibuprofen 600-800 mg TID, versus 500-650 mg Tylenol. We also discussed prescribing topical diclofenac (Voltaren gel) as well as once daily Meloxicam 15 mg.\par (2) The patient has More Than One chronic injuries/illnesses as outlined, discussed, and documented by ICD 10 codes listed, as well as the HPI and Plan section.\par There is a moderate risk of morbidity with further treatment, especially from use of prescription strength medications and possible side effects of these medications which include upset stomach and cardiac/renal issues with long term use were discussed.\par (3) I recommended that the patient follow-up with their medical physician to discuss any significant specific potential issues with long term use such as interactions with current medications or with exacerbation of underlying medical morbidities. \par \par Attestation:\par I, Marlin Sibley , attest that this documentation has been prepared under the direction and in the presence of Provider Johann Gaona MD.\par The documentation recorded by the scribe, in my presence, accurately reflects the service I personally performed, and the decisions made by me with my edits as appropriate.\par Johann Gaona MD\par

## 2023-06-05 NOTE — HISTORY OF PRESENT ILLNESS
[de-identified] : This is a 61 yo male presenting to the office c/o ongoing right  shoulder pain x months. Patient has an MRI from [facility] to review. Pt admits pain is not an acute pain. Patient has recent neck surgery and is recovering well. Pain is described as constant, severe in quality. Currently pain is 6/10 and non-radiating. Patient reports pain has been getting progressively worse. Pain is worse at night. Overall pain does improve with rest and ice. Patient denies any numbness or tingling.\par

## 2023-06-06 ENCOUNTER — OFFICE (OUTPATIENT)
Dept: URBAN - METROPOLITAN AREA CLINIC 102 | Facility: CLINIC | Age: 63
Setting detail: OPHTHALMOLOGY
End: 2023-06-06
Payer: MEDICARE

## 2023-06-06 DIAGNOSIS — H20.00: ICD-10-CM

## 2023-06-06 PROBLEM — H25.12 CATARACT SENILE NUCLEAR SCLEROSIS;  , LEFT EYE: Status: ACTIVE | Noted: 2023-05-18

## 2023-06-06 PROBLEM — Z96.1 PSEUDOPHAKIA-1 WEEK P/O CAT WITH IOL: Status: ACTIVE | Noted: 2023-05-18

## 2023-06-06 PROCEDURE — 99213 OFFICE O/P EST LOW 20 MIN: CPT | Performed by: OPHTHALMOLOGY

## 2023-06-06 ASSESSMENT — KERATOMETRY
OS_AXISANGLE_DEGREES: 127
OD_AXISANGLE_DEGREES: 046
OD_K2POWER_DIOPTERS: 41.00
METHOD_AUTO_MANUAL: AUTO
OS_K2POWER_DIOPTERS: 41.25
OD_K1POWER_DIOPTERS: 40.25
OS_K1POWER_DIOPTERS: 40.75

## 2023-06-06 ASSESSMENT — VISUAL ACUITY
OS_BCVA: 20/20-1
OD_BCVA: 20/25

## 2023-06-06 ASSESSMENT — REFRACTION_AUTOREFRACTION
OD_CYLINDER: -1.25
OD_SPHERE: +0.25
OD_AXIS: 130
OS_SPHERE: +1.00
OS_AXIS: 037
OS_CYLINDER: -0.25

## 2023-06-06 ASSESSMENT — TONOMETRY
OD_IOP_MMHG: 10
OS_IOP_MMHG: 13

## 2023-06-06 ASSESSMENT — CONFRONTATIONAL VISUAL FIELD TEST (CVF)
OD_FINDINGS: FULL
OS_FINDINGS: FULL

## 2023-06-06 ASSESSMENT — REFRACTION_MANIFEST
OD_VA1: 20/40
OD_SPHERE: -1.00
OD_CYLINDER: -1.00
OD_AXIS: 90

## 2023-06-06 ASSESSMENT — AXIALLENGTH_DERIVED
OD_AL: 25.3549
OS_AL: 24.1838
OD_AL: 24.8586

## 2023-06-06 ASSESSMENT — SPHEQUIV_DERIVED
OD_SPHEQUIV: -1.5
OD_SPHEQUIV: -0.375
OS_SPHEQUIV: 0.875

## 2023-06-06 ASSESSMENT — PACHYMETRY
OS_CT_CORRECTION: -1
OS_CT_UM: 559
OD_CT_CORRECTION: 0
OD_CT_UM: 545

## 2023-06-13 ENCOUNTER — RESULT REVIEW (OUTPATIENT)
Age: 63
End: 2023-06-13

## 2023-06-14 ENCOUNTER — OFFICE (OUTPATIENT)
Dept: URBAN - METROPOLITAN AREA CLINIC 102 | Facility: CLINIC | Age: 63
Setting detail: OPHTHALMOLOGY
End: 2023-06-14
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
## 2023-06-14 PROCEDURE — NO SHOW FE NO SHOW FEE: Performed by: OPHTHALMOLOGY

## 2023-06-19 ENCOUNTER — APPOINTMENT (OUTPATIENT)
Dept: ORTHOPEDIC SURGERY | Facility: CLINIC | Age: 63
End: 2023-06-19
Payer: MEDICARE

## 2023-06-19 PROCEDURE — 99214 OFFICE O/P EST MOD 30 MIN: CPT

## 2023-06-19 NOTE — DISCUSSION/SUMMARY
[de-identified] : This is a 61 yo male presenting to the office c/o ongoing right  shoulder pain x months\par Pain is minimal but IR motion aggravates the pain\par MRI reviewed demonstrates Large full-thickness rotator cuff tear with complete discontinuity of both supraspinatus and infraspinatus tendons.\par \par Based on review of the MRI, this is a massive irreparable rotator cuff tear.\par Minimally invasive arthroscopic options include balloon arthroplasty versus debridement and partial repair however this will not restore strength.\par \par Reverse shoulder arthroplasty is recommended for functional improvement including strength and elevating above shoulder height.\par It is also recommended for significant pain or arthritis.  Patient will likely develop arthritis over the next 2 to 5 years as result of this injury.\par \par I did explain to the patient he will lose internal rotation, or motion putting the hand behind the back which I showed him today.\par In addition, there is no guarantee that even reverse shoulder replacement will allow for return of overhead sports, throwing, or any other physical activities.  Although, I did explain that reverse shoulder arthroplasty has a higher likelihood of allowing basic racquet sports such as pickleball and other activities below shoulder height.\par \par I did explain that surgery would take approximately 90 minutes, patient would need a sling for 4 weeks, including 2 weeks full-time and 2 weeks part time.  Recovery after surgery anywhere between 8 to 12 weeks. \par \par Patient will call and notify the office if he wishes to proceed with reverse shoulder arthroplasty.  We will need a CT scan for presurgical planning and will need to get approval through his insurance.  Patient will likely have to come to the office for documentation of clinicals for CT scan.\par \par \par (1) We discussed a comprehensive treatment plans that included a prescription management plan involving the use of prescription strength medications to include Ibuprofen 600-800 mg TID, versus 500-650 mg Tylenol. We also discussed prescribing topical diclofenac (Voltaren gel) as well as once daily Meloxicam 15 mg.\par (2) The patient has More Than One chronic injuries/illnesses as outlined, discussed, and documented by ICD 10 codes listed, as well as the HPI and Plan section.\par There is a moderate risk of morbidity with further treatment, especially from use of prescription strength medications and possible side effects of these medications which include upset stomach and cardiac/renal issues with long term use were discussed.\par (3) I recommended that the patient follow-up with their medical physician to discuss any significant specific potential issues with long term use such as interactions with current medications or with exacerbation of underlying medical morbidities. \par \par Attestation:\par I, Marlin Maryjo , attest that this documentation has been prepared under the direction and in the presence of Provider Johann Gaona MD.\par The documentation recorded by the scribe, in my presence, accurately reflects the service I personally performed, and the decisions made by me with my edits as appropriate.\par Johann Gaona MD\par

## 2023-06-19 NOTE — PHYSICAL EXAM
[de-identified] : Constitutional: The general appearance of the patient is well developed, well nourished, no deformities and well groomed. Normal\par \par Gait: Gait and function is as follows: normal gait.\par \par Skin: Head and neck visualized skin is normal. Left upper extremity visualized skin is normal. Right upper extremity visualized skin is normal. Thoracic Skin of the thoracic spine shows visualized skin is normal.\par \par Cardiovascular: palpable radial pulse bilaterally, good capillary refill in digits of the bilateral upper extremities and no temperature or color changes in the bilateral upper extremities.\par \par Lymphatic: Normal Palpation of lymph nodes in the cervical.\par \par Neurologic: fine motor control in the bilateral upper extremities is intact. Deep Tendon Reflexes in Upper and Lower Extremities Negative Garcia's in the bilateral upper extremities. The patient is oriented to time, place and person. Sensation to light touch intact in the bilateral upper extremities. Mood and Affect is normal.\par \par Right Shoulder: Inspection of the shoulder/upper arm is as follows: no scapula winging, no biceps deformity and no AC joint deformity. Palpation of the shoulder/upper arm is as follows: There is tenderness at the proximal biceps tendon. Range of motion of the shoulder is as follows: Pain with internal rotation, external rotation, abduction and forward flexion. Strength of the shoulder is as follows: Supraspinatus 4/5. External Rotation 4/5. Internal Rotation 4/5. Deltoid 5/5 Ligament Stability and Special Tests of the shoulder is as follows: Neer test is positive. Desouza' test is positive. Speed's test is positive\par \par Left Shoulder: Inspection of the shoulder/upper arm is as follows: There is a full, pain-free, stable range of motion of the shoulder with normal strength and no tenderness to palpation.\par \par Neck:\par \par Inspection / Palpation of the cervical spine is as follows: mild paracervical tenderness. Range of motion of the cervical spine is as follows: moderately decreased range of motion of the cervical spine. Stability testing for the cervical spine is as follows Stable range of motion.\par \par Back, including spine: Inspection / Palpation of the thoracic/lumbar spine is as follows: There is a full, pain free, stable range of motion of the thoracic spine with a normal tone and not tenderness to palpation..\par

## 2023-06-19 NOTE — HISTORY OF PRESENT ILLNESS
[de-identified] : This is a 61 yo male presenting to the office c/o ongoing right  shoulder pain x months. Patient has an MRI from [facility] to review. Pt admits pain is not an acute pain. Patient has recent neck surgery and is recovering well. Pain is described as constant, severe in quality. Currently pain is 6/10 and non-radiating. Patient reports pain has been getting progressively worse. Pain is worse at night. Overall pain does improve with rest and ice. Patient denies any numbness or tingling.\par \par 6/19/23: Pt here for right shoulder pain. Pt has an MRI to review. Pt suffered an injury a year ago when he fell on the ice. Pt would like to go to back to playing pickle ball and golf.

## 2023-06-19 NOTE — DATA REVIEWED
[FreeTextEntry1] : MRI RT shoulder 6/13/23 [facility]\par \par Large full-thickness rotator cuff tear with complete discontinuity of both\par supraspinatus and infraspinatus tendons. Torn fibers are scarred to the joint\par capsule just medial to the glenoid. Humeral head is slightly high riding. There\par is fatty atrophy of the associated rotator cuff musculature.\par \par Moderate subscapularis tendinosis.\par \par Severe AC and mild glenohumeral arthrosis.\par \par

## 2023-06-28 ENCOUNTER — RX RENEWAL (OUTPATIENT)
Age: 63
End: 2023-06-28

## 2023-07-13 ENCOUNTER — OFFICE (OUTPATIENT)
Dept: URBAN - METROPOLITAN AREA CLINIC 102 | Facility: CLINIC | Age: 63
Setting detail: OPHTHALMOLOGY
End: 2023-07-13
Payer: MEDICARE

## 2023-07-13 DIAGNOSIS — Z96.1: ICD-10-CM

## 2023-07-13 DIAGNOSIS — H02.834: ICD-10-CM

## 2023-07-13 DIAGNOSIS — H02.831: ICD-10-CM

## 2023-07-13 PROCEDURE — 99024 POSTOP FOLLOW-UP VISIT: CPT | Performed by: OPHTHALMOLOGY

## 2023-07-13 ASSESSMENT — KERATOMETRY
METHOD_AUTO_MANUAL: AUTO
OS_K1POWER_DIOPTERS: 40.75
OS_K2POWER_DIOPTERS: 41.00
OD_K2POWER_DIOPTERS: 41.25
OD_K1POWER_DIOPTERS: 40.25
OS_AXISANGLE_DEGREES: 114
OD_AXISANGLE_DEGREES: 048

## 2023-07-13 ASSESSMENT — REFRACTION_AUTOREFRACTION
OD_AXIS: 115
OD_CYLINDER: -1.25
OS_CYLINDER: -0.50
OS_AXIS: 037
OS_SPHERE: +1.25
OD_SPHERE: 0.00

## 2023-07-13 ASSESSMENT — LID POSITION - DERMATOCHALASIS
OS_DERMATOCHALASIS: LUL 3+
OD_DERMATOCHALASIS: RUL 3+

## 2023-07-13 ASSESSMENT — TONOMETRY
OS_IOP_MMHG: 15
OD_IOP_MMHG: 14

## 2023-07-13 ASSESSMENT — PACHYMETRY
OD_CT_CORRECTION: 0
OS_CT_UM: 559
OS_CT_CORRECTION: -1
OD_CT_UM: 545

## 2023-07-13 ASSESSMENT — SPHEQUIV_DERIVED
OS_SPHEQUIV: 1
OD_SPHEQUIV: -0.625
OD_SPHEQUIV: -1.5

## 2023-07-13 ASSESSMENT — AXIALLENGTH_DERIVED
OD_AL: 24.9159
OS_AL: 24.1808
OD_AL: 25.302

## 2023-07-13 ASSESSMENT — REFRACTION_MANIFEST
OD_VA1: 20/40
OD_SPHERE: -1.00
OD_CYLINDER: -1.00
OD_AXIS: 90

## 2023-07-13 ASSESSMENT — CONFRONTATIONAL VISUAL FIELD TEST (CVF)
OD_FINDINGS: FULL
OS_FINDINGS: FULL

## 2023-07-13 ASSESSMENT — VISUAL ACUITY
OD_BCVA: 20/20
OS_BCVA: 20/20

## 2023-07-31 ENCOUNTER — RESULT REVIEW (OUTPATIENT)
Age: 63
End: 2023-07-31

## 2023-08-03 ENCOUNTER — APPOINTMENT (OUTPATIENT)
Dept: INTERNAL MEDICINE | Facility: CLINIC | Age: 63
End: 2023-08-03
Payer: MEDICARE

## 2023-08-03 ENCOUNTER — NON-APPOINTMENT (OUTPATIENT)
Age: 63
End: 2023-08-03

## 2023-08-03 VITALS
DIASTOLIC BLOOD PRESSURE: 84 MMHG | WEIGHT: 195 LBS | BODY MASS INDEX: 25.84 KG/M2 | TEMPERATURE: 97.8 F | RESPIRATION RATE: 16 BRPM | HEIGHT: 73 IN | SYSTOLIC BLOOD PRESSURE: 142 MMHG | HEART RATE: 86 BPM | OXYGEN SATURATION: 97 %

## 2023-08-03 DIAGNOSIS — R94.31 ABNORMAL ELECTROCARDIOGRAM [ECG] [EKG]: ICD-10-CM

## 2023-08-03 DIAGNOSIS — M10.9 GOUT, UNSPECIFIED: ICD-10-CM

## 2023-08-03 DIAGNOSIS — I10 ESSENTIAL (PRIMARY) HYPERTENSION: ICD-10-CM

## 2023-08-03 DIAGNOSIS — F41.9 ANXIETY DISORDER, UNSPECIFIED: ICD-10-CM

## 2023-08-03 DIAGNOSIS — Z87.891 PERSONAL HISTORY OF NICOTINE DEPENDENCE: ICD-10-CM

## 2023-08-03 PROCEDURE — 99214 OFFICE O/P EST MOD 30 MIN: CPT

## 2023-08-03 NOTE — PLAN
[FreeTextEntry1] : Mr. Fleming presents for follow-up evaluation.  1.  Patient will continue on current medication regimen which has been reviewed and revised.  2.  Patient has a history of a prolonged QT interval on preoperative EKG from 10/21/2022.  Current EKG shows no evidence of prolonged QT interval.  There is normal sinus rhythm.  3.  Patient will schedule preoperative evaluation for shoulder replacement in October.  5.  Prescription for CBC, CMP, hemoglobin A1c, iron level, lipid profile, TSH level, PSA and urinalysis  6.  Follow-up in 6 months.

## 2023-08-03 NOTE — HISTORY OF PRESENT ILLNESS
[FreeTextEntry1] : Follow-up [de-identified] : History Lonnie presents for follow-up evaluation.  He is feeling well.  He denies any chest pain, shortness of breath or palpitations.  He has a history of hypertension and pulmonary embolism.  He remains on chronic anticoagulation with Eliquis 5 mg p.o. twice daily.  Patient has no nocturnal symptoms of cough or shortness of breath.

## 2023-08-07 ENCOUNTER — NON-APPOINTMENT (OUTPATIENT)
Age: 63
End: 2023-08-07

## 2023-08-07 LAB
ALBUMIN SERPL ELPH-MCNC: 4.8 G/DL
ALP BLD-CCNC: 71 U/L
ALT SERPL-CCNC: 20 U/L
ANION GAP SERPL CALC-SCNC: 14 MMOL/L
AST SERPL-CCNC: 23 U/L
BILIRUB SERPL-MCNC: 0.5 MG/DL
BUN SERPL-MCNC: 19 MG/DL
CALCIUM SERPL-MCNC: 9.6 MG/DL
CHLORIDE SERPL-SCNC: 104 MMOL/L
CHOLEST SERPL-MCNC: 179 MG/DL
CO2 SERPL-SCNC: 21 MMOL/L
CREAT SERPL-MCNC: 1.12 MG/DL
EGFR: 73 ML/MIN/1.73M2
ESTIMATED AVERAGE GLUCOSE: 103 MG/DL
GLUCOSE SERPL-MCNC: 81 MG/DL
HBA1C MFR BLD HPLC: 5.2 %
HDLC SERPL-MCNC: 40 MG/DL
IRON SERPL-MCNC: 123 UG/DL
LDLC SERPL CALC-MCNC: 88 MG/DL
NONHDLC SERPL-MCNC: 139 MG/DL
POTASSIUM SERPL-SCNC: 4.3 MMOL/L
PROT SERPL-MCNC: 6.5 G/DL
PSA SERPL-MCNC: 0.61 NG/ML
SODIUM SERPL-SCNC: 139 MMOL/L
TRIGL SERPL-MCNC: 311 MG/DL
TSH SERPL-ACNC: 4.7 UIU/ML
URATE SERPL-MCNC: 4.6 MG/DL

## 2023-08-09 ENCOUNTER — NON-APPOINTMENT (OUTPATIENT)
Age: 63
End: 2023-08-09

## 2023-08-29 ENCOUNTER — RX RENEWAL (OUTPATIENT)
Age: 63
End: 2023-08-29

## 2023-09-11 ENCOUNTER — RX RENEWAL (OUTPATIENT)
Age: 63
End: 2023-09-11

## 2023-09-25 NOTE — PATIENT PROFILE ADULT - NSPROPTRIGHTSUPPORTPERSON_GEN_A_NUR
LCV: 6/12/23  Today she is accompanied by mother.    Chief Complaint   Patient presents with   • Office Visit   • Acne   • Follow-up     ALLERGIES:  No Known Allergies  Current Outpatient Medications   Medication Sig Dispense Refill   • cephalexin (KEFLEX) 250 MG capsule Take 3 capsules by mouth in the morning and 3 capsules in the evening. Do all this for 7 days. 42 capsule 0   • fluticasone (FLONASE) 50 MCG/ACT nasal spray Spray 2 sprays in each nostril daily. 16 g 1   • neomycin-polymyxin B-dexamethasone (MAXITROL) 3.5-89870-4.1 ophthalmic suspension Place 1 drop into both eyes 4 times daily. 5 mL 0   • clindamycin (CLEOCIN T) 1 % lotion Apply twice daily to the affected areas as directed 120 mL 11   • tretinoin (RETIN-A) 0.025 % cream Apply nightly as a pea-size dose of medication to each of the five areas of the face (central forehead, chin, nose, and cheeks) 45 g 11   • tobramycin (TOBREX) 0.3 % ophthalmic solution Place 2 drops into left eye every 4 hours. 5 mL 0   • fexofenadine (ALLEGRA) 180 MG tablet Take 180 mg by mouth daily.       No current facility-administered medications for this visit.     Past Medical History:   Diagnosis Date   • Hordeolum externum (stye)      Social History     Socioeconomic History   • Marital status: Other     Spouse name: Not on file   • Number of children: Not on file   • Years of education: Not on file   • Highest education level: Not on file   Occupational History   • Not on file   Tobacco Use   • Smoking status: Never   • Smokeless tobacco: Never   Substance and Sexual Activity   • Alcohol use: Not on file   • Drug use: Not on file   • Sexual activity: Not on file   Other Topics Concern   • Not on file   Social History Narrative   • Not on file     Social Determinants of Health     Financial Resource Strain: Not on file   Food Insecurity: Not on file   Transportation Needs: Not on file   Physical Activity: Not on file   Stress: Not on file   Social Connections: Not on  file   Intimate Partner Violence: Not on file     Patient's medications, allergies, past medical, surgical, social and family histories were reviewed and updated as appropriate.      HPI: 10 year old female here for acne follow-up; acne currently on tretinoin and clindamycin; patient has been having some cysts on her face treated by PCP with Keflex- helped a little; some are tender to touch.    Previous treatments include: Differin gel OTC.   Personal h/o skin cancer: none.   Family h/o skin cancer : none.    ROS:   General:No fever, URI, Arthralgias  Skin:No other skin complaints today. Besides positives in HPI, all other systems negative     PE:    Alert and oriented x3   Well developed, well nourished, normal mood and affect  Eyes: No injection, No respiratory distress.  Focused skin exam significant for:scattered comedones, few red papules, pustules, acneiform scars located on the cheeks, forehead and hairline     ~2.8 x 2 cm fluctuant, skin colored tender nodule located on the right cheek    ~1.3 cm fluctuant, tender, warm, erythematous nodule located on the left lateral cheek    ~ 1.5 cm fluctuant, tender, warm, erythematous nodule located on the left upper medial cheek               A/P:  1.Skin abscess  -New problem to provider  -Rx given for doxycycline; side effects discussed with patient/parent today  -Probiotics daily  -Recommended I&D but patient declined procedure today  -Follow-up here in 3 to 4 weeks or sooner if needed for recheck    2.Acne vulgaris  -Established problem requiring prescription drug management  -Diagnosis, course, treatment discussed.   -Continue topical clindamycin and tretinoin  -Medication side effects reviewed with patient/parent today  -Use mild soaps, CeraVe hydrating cleanser recommended  -Keep areas clean  -Don't pick lesions  -Avoid strong cleansers, cosmetics, and moisturizers  -Use noncomedogenic and oil free products    I have personally reviewed and analyzed past  dermatology clinic notes and PCP notes (if relevant)  Patient/parent indicated understanding of the diagnosis and agreed with the plan.    RTC: 3 to 4 weeks or as needed  Call office with questions or concerns.    Electronically Signed by: GELY Mukherjee  Consulting Physician : Dr Chavo Dang MD   9/25/2023    This dictation was created using Dragon voice recognition software and thus transcription errors or variance may occur     declines

## 2023-09-26 ENCOUNTER — RX RENEWAL (OUTPATIENT)
Age: 63
End: 2023-09-26

## 2023-10-03 ENCOUNTER — APPOINTMENT (OUTPATIENT)
Dept: INTERNAL MEDICINE | Facility: CLINIC | Age: 63
End: 2023-10-03

## 2023-10-04 ENCOUNTER — NON-APPOINTMENT (OUTPATIENT)
Age: 63
End: 2023-10-04

## 2023-10-13 ENCOUNTER — APPOINTMENT (OUTPATIENT)
Dept: ORTHOPEDIC SURGERY | Facility: CLINIC | Age: 63
End: 2023-10-13
Payer: MEDICARE

## 2023-10-13 DIAGNOSIS — M75.121 COMPLETE ROTATOR CUFF TEAR OR RUPTURE OF RIGHT SHOULDER, NOT SPECIFIED AS TRAUMATIC: ICD-10-CM

## 2023-10-13 PROCEDURE — 99214 OFFICE O/P EST MOD 30 MIN: CPT

## 2023-10-13 RX ORDER — TRAMADOL HYDROCHLORIDE 50 MG/1
50 TABLET, COATED ORAL
Qty: 20 | Refills: 0 | Status: ACTIVE | COMMUNITY
Start: 2023-10-13 | End: 1900-01-01

## 2023-10-20 ENCOUNTER — APPOINTMENT (OUTPATIENT)
Dept: INTERNAL MEDICINE | Facility: CLINIC | Age: 63
End: 2023-10-20
Payer: MEDICARE

## 2023-10-20 VITALS
DIASTOLIC BLOOD PRESSURE: 90 MMHG | RESPIRATION RATE: 16 BRPM | SYSTOLIC BLOOD PRESSURE: 150 MMHG | HEIGHT: 73 IN | WEIGHT: 211 LBS | TEMPERATURE: 97.7 F | OXYGEN SATURATION: 96 % | BODY MASS INDEX: 27.96 KG/M2 | HEART RATE: 85 BPM

## 2023-10-20 DIAGNOSIS — Z01.818 ENCOUNTER FOR OTHER PREPROCEDURAL EXAMINATION: ICD-10-CM

## 2023-10-20 DIAGNOSIS — I26.99 OTHER PULMONARY EMBOLISM W/OUT ACUTE COR PULMONALE: ICD-10-CM

## 2023-10-20 DIAGNOSIS — I82.491 ACUTE EMBOLISM AND THROMBOSIS OF OTHER SPECIFIED DEEP VEIN OF RIGHT LOWER EXTREMITY: ICD-10-CM

## 2023-10-20 DIAGNOSIS — D68.2 HEREDITARY DEFICIENCY OF OTHER CLOTTING FACTORS: ICD-10-CM

## 2023-10-20 LAB — HBA1C MFR BLD HPLC: 4.9

## 2023-10-20 PROCEDURE — 99214 OFFICE O/P EST MOD 30 MIN: CPT

## 2023-10-20 RX ORDER — ALPRAZOLAM 0.5 MG/1
0.5 TABLET ORAL
Qty: 30 | Refills: 0 | Status: ACTIVE | COMMUNITY
Start: 2021-09-13 | End: 1900-01-01

## 2023-10-24 ENCOUNTER — APPOINTMENT (OUTPATIENT)
Dept: ORTHOPEDIC SURGERY | Facility: HOSPITAL | Age: 63
End: 2023-10-24
Payer: MEDICARE

## 2023-10-24 ENCOUNTER — RESULT REVIEW (OUTPATIENT)
Age: 63
End: 2023-10-24

## 2023-10-24 PROCEDURE — 23472 RECONSTRUCT SHOULDER JOINT: CPT | Mod: RT

## 2023-10-24 PROCEDURE — 23472 RECONSTRUCT SHOULDER JOINT: CPT | Mod: AS,RT

## 2023-10-30 ENCOUNTER — APPOINTMENT (OUTPATIENT)
Dept: ORTHOPEDIC SURGERY | Facility: CLINIC | Age: 63
End: 2023-10-30
Payer: MEDICARE

## 2023-10-30 ENCOUNTER — RX RENEWAL (OUTPATIENT)
Age: 63
End: 2023-10-30

## 2023-10-30 PROCEDURE — 99024 POSTOP FOLLOW-UP VISIT: CPT

## 2023-10-30 PROCEDURE — 73030 X-RAY EXAM OF SHOULDER: CPT | Mod: RT

## 2023-11-27 ENCOUNTER — APPOINTMENT (OUTPATIENT)
Dept: ORTHOPEDIC SURGERY | Facility: CLINIC | Age: 63
End: 2023-11-27
Payer: MEDICARE

## 2023-11-27 VITALS — HEIGHT: 73 IN | BODY MASS INDEX: 27.96 KG/M2 | WEIGHT: 211 LBS

## 2023-11-27 PROCEDURE — 73030 X-RAY EXAM OF SHOULDER: CPT | Mod: RT

## 2023-11-27 PROCEDURE — 99024 POSTOP FOLLOW-UP VISIT: CPT

## 2023-12-11 NOTE — ED ADULT TRIAGE NOTE - PAIN: PRESENCE, MLM
complains of pain/discomfort

## 2023-12-17 ENCOUNTER — RX RENEWAL (OUTPATIENT)
Age: 63
End: 2023-12-17

## 2024-01-08 ENCOUNTER — APPOINTMENT (OUTPATIENT)
Dept: ORTHOPEDIC SURGERY | Facility: CLINIC | Age: 64
End: 2024-01-08
Payer: MEDICARE

## 2024-01-08 PROCEDURE — 99024 POSTOP FOLLOW-UP VISIT: CPT

## 2024-01-08 NOTE — HISTORY OF PRESENT ILLNESS
[de-identified] : This is a 61 yo male presenting to the office c/o ongoing right  shoulder pain x months. Patient has an [facility] from [facility] to review. Pt admits pain is not an acute pain. Patient has recent neck surgery and is recovering well. Pain is described as constant, severe in quality. Currently pain is 6/10 and non-radiating. Patient reports pain has been getting progressively worse. Pain is worse at night. Overall pain does improve with rest and ice. Patient denies any numbness or tingling. 10/13/23: Patient returns today for follow-up of ongoing right shoulder pain.  Patient has massive irreparable rotator cuff tear.  Pain has been getting progressively worse.  He is having difficulty completing Tuesday living.  Patient is failed conservative management.  Wishes to discuss surgery.  10/30/23: Patient presents 6 days s/p right reverse total shoulder replacement and biceps tenodesis. Patient is doing well, pain is controlled. Patient has been compliant with the brace. Denies any fever, chills, drainage.  11/27/23: Patient presents 5 weeks s/p right reverse total shoulder replacement and biceps tenodesis. Pt doing well but has stiffness with external rotation   1/8/24: Patient presents 9 weeks  s/p right reverse total shoulder replacement and biceps tenodesis. Pt reports he has no pain with daily activities. Pt notes residual stiffness with forward flexion

## 2024-01-08 NOTE — PHYSICAL EXAM
[de-identified] : Constitutional: The general appearance of the patient is well developed, well nourished, no deformities and well groomed. Normal\par  \par  Gait: Gait and function is as follows: normal gait.\par  \par  Skin: Head and neck visualized skin is normal. Left upper extremity visualized skin is normal. Right upper extremity visualized skin is normal. Thoracic Skin of the thoracic spine shows visualized skin is normal.\par  \par  Cardiovascular: palpable radial pulse bilaterally, good capillary refill in digits of the bilateral upper extremities and no temperature or color changes in the bilateral upper extremities.\par  \par  Lymphatic: Normal Palpation of lymph nodes in the cervical.\par  \par  Neurologic: fine motor control in the bilateral upper extremities is intact. Deep Tendon Reflexes in Upper and Lower Extremities Negative Garcia's in the bilateral upper extremities. The patient is oriented to time, place and person. Sensation to light touch intact in the bilateral upper extremities. Mood and Affect is normal.\par  \par  Right Shoulder: Inspection of the shoulder/upper arm is as follows: no scapula winging, no biceps deformity and no AC joint deformity. Palpation of the shoulder/upper arm is as follows: There is tenderness at the proximal biceps tendon. Range of motion of the shoulder is as follows: Pain with internal rotation, external rotation, abduction and forward flexion. Strength of the shoulder is as follows: Supraspinatus 4/5. External Rotation 4/5. Internal Rotation 4/5. Deltoid 5/5 Ligament Stability and Special Tests of the shoulder is as follows: Neer test is positive. Desouza' test is positive. Speed's test is positive\par  \par  Left Shoulder: Inspection of the shoulder/upper arm is as follows: There is a full, pain-free, stable range of motion of the shoulder with normal strength and no tenderness to palpation.\par  \par  Neck:\par  \par  Inspection / Palpation of the cervical spine is as follows: mild paracervical tenderness. Range of motion of the cervical spine is as follows: moderately decreased range of motion of the cervical spine. Stability testing for the cervical spine is as follows Stable range of motion.\par  \par  Back, including spine: Inspection / Palpation of the thoracic/lumbar spine is as follows: There is a full, pain free, stable range of motion of the thoracic spine with a normal tone and not tenderness to palpation..\par

## 2024-01-08 NOTE — DISCUSSION/SUMMARY
[de-identified] : This is a 62 yo male is 9 weeks s/p right reverse total shoulder replacement, and biceps tenodesis . patient is doing well, pain is controlled. Prio xrays demonstrates excellent overall alignment. Patient was provided PT prescription according to reverse protocol. Continue elbow,wrist,hand motion. Rows showed in office to strengthen scapula  Patient will follow up in 3 months   (1) We discussed a comprehensive treatment plans that included a prescription management plan involving the use of prescription strength medications to include Ibuprofen 600-800 mg TID, versus 500-650 mg Tylenol. We also discussed prescribing topical diclofenac (Voltaren gel) as well as once daily Meloxicam 15 mg. (2) The patient has More Than One chronic injuries/illnesses as outlined, discussed, and documented by ICD 10 codes listed, as well as the HPI and Plan section. There is a moderate risk of morbidity with further treatment, especially from use of prescription strength medications and possible side effects of these medications which include upset stomach and cardiac/renal issues with long term use were discussed. (3) I recommended that the patient follow-up with their medical physician to discuss any significant specific potential issues with long term use such as interactions with current medications or with exacerbation of underlying medical morbidities.   Attestation: I, Marlin DIAZ'Moni , attest that this documentation has been prepared under the direction and in the presence of Provider Johann Gaona MD. The documentation recorded by the scribe, in my presence, accurately reflects the service I personally performed, and the decisions made by me with my edits as appropriate. Johann Gaona MD

## 2024-01-25 ENCOUNTER — NON-APPOINTMENT (OUTPATIENT)
Age: 64
End: 2024-01-25

## 2024-03-04 ENCOUNTER — RX RENEWAL (OUTPATIENT)
Age: 64
End: 2024-03-04

## 2024-03-04 RX ORDER — APIXABAN 5 MG/1
5 TABLET, FILM COATED ORAL
Qty: 60 | Refills: 5 | Status: ACTIVE | COMMUNITY
Start: 2021-10-09 | End: 1900-01-01

## 2024-03-04 RX ORDER — ALLOPURINOL 300 MG/1
300 TABLET ORAL DAILY
Qty: 30 | Refills: 3 | Status: ACTIVE | COMMUNITY
Start: 2022-09-27 | End: 1900-01-01

## 2024-03-10 ENCOUNTER — RX RENEWAL (OUTPATIENT)
Age: 64
End: 2024-03-10

## 2024-03-10 RX ORDER — AMLODIPINE BESYLATE 5 MG/1
5 TABLET ORAL DAILY
Qty: 30 | Refills: 5 | Status: ACTIVE | COMMUNITY
Start: 2023-04-04 | End: 1900-01-01

## 2024-04-05 ENCOUNTER — RX RENEWAL (OUTPATIENT)
Age: 64
End: 2024-04-05

## 2024-04-05 RX ORDER — ENALAPRIL MALEATE 20 MG/1
20 TABLET ORAL DAILY
Qty: 30 | Refills: 5 | Status: ACTIVE | COMMUNITY
Start: 2023-04-04 | End: 1900-01-01

## 2024-04-29 ENCOUNTER — NON-APPOINTMENT (OUTPATIENT)
Age: 64
End: 2024-04-29

## 2024-05-06 ENCOUNTER — APPOINTMENT (OUTPATIENT)
Dept: ORTHOPEDIC SURGERY | Facility: CLINIC | Age: 64
End: 2024-05-06
Payer: MEDICARE

## 2024-05-06 DIAGNOSIS — M25.511 PAIN IN RIGHT SHOULDER: ICD-10-CM

## 2024-05-06 DIAGNOSIS — Z96.611 PRESENCE OF RIGHT ARTIFICIAL SHOULDER JOINT: ICD-10-CM

## 2024-05-06 DIAGNOSIS — M67.911 UNSPECIFIED DISORDER OF SYNOVIUM AND TENDON, RIGHT SHOULDER: ICD-10-CM

## 2024-05-06 PROCEDURE — 99214 OFFICE O/P EST MOD 30 MIN: CPT

## 2024-05-06 PROCEDURE — 73030 X-RAY EXAM OF SHOULDER: CPT | Mod: RT

## 2024-05-06 NOTE — PHYSICAL EXAM
[Right] : right shoulder [Type 2 acromion] : Type 2 acromion [Components well fixed, in good position] : Components well fixed, in good position [de-identified] : Constitutional: The general appearance of the patient is well developed, well nourished, no deformities and well groomed. Normal\par  \par  Gait: Gait and function is as follows: normal gait.\par  \par  Skin: Head and neck visualized skin is normal. Left upper extremity visualized skin is normal. Right upper extremity visualized skin is normal. Thoracic Skin of the thoracic spine shows visualized skin is normal.\par  \par  Cardiovascular: palpable radial pulse bilaterally, good capillary refill in digits of the bilateral upper extremities and no temperature or color changes in the bilateral upper extremities.\par  \par  Lymphatic: Normal Palpation of lymph nodes in the cervical.\par  \par  Neurologic: fine motor control in the bilateral upper extremities is intact. Deep Tendon Reflexes in Upper and Lower Extremities Negative Garcia's in the bilateral upper extremities. The patient is oriented to time, place and person. Sensation to light touch intact in the bilateral upper extremities. Mood and Affect is normal.\par  \par  Right Shoulder: Inspection of the shoulder/upper arm is as follows: no scapula winging, no biceps deformity and no AC joint deformity. Palpation of the shoulder/upper arm is as follows: There is tenderness at the proximal biceps tendon. Range of motion of the shoulder is as follows: Pain with internal rotation, external rotation, abduction and forward flexion. Strength of the shoulder is as follows: Supraspinatus 4/5. External Rotation 4/5. Internal Rotation 4/5. Deltoid 5/5 Ligament Stability and Special Tests of the shoulder is as follows: Neer test is positive. Desouza' test is positive. Speed's test is positive\par  \par  Left Shoulder: Inspection of the shoulder/upper arm is as follows: There is a full, pain-free, stable range of motion of the shoulder with normal strength and no tenderness to palpation.\par  \par  Neck:\par  \par  Inspection / Palpation of the cervical spine is as follows: mild paracervical tenderness. Range of motion of the cervical spine is as follows: moderately decreased range of motion of the cervical spine. Stability testing for the cervical spine is as follows Stable range of motion.\par  \par  Back, including spine: Inspection / Palpation of the thoracic/lumbar spine is as follows: There is a full, pain free, stable range of motion of the thoracic spine with a normal tone and not tenderness to palpation..\par

## 2024-05-06 NOTE — DISCUSSION/SUMMARY
[de-identified] : This is a 64 yo male is 6 months s/p right reverse total shoulder replacement, and biceps tenodesis . patient is doing well, pain is controlled. X-rays demonstrates excellent overall alignment. Patient has transitioned to HEP Continue elbow,wrist,hand motion. Patient will follow up in 1 year  Patient was instructed on safe transition to golf and pickleball.  (1) We discussed a comprehensive treatment plans that included a prescription management plan involving the use of prescription strength medications to include Ibuprofen 600-800 mg TID, versus 500-650 mg Tylenol. We also discussed prescribing topical diclofenac (Voltaren gel) as well as once daily Meloxicam 15 mg. (2) The patient has More Than One chronic injuries/illnesses as outlined, discussed, and documented by ICD 10 codes listed, as well as the HPI and Plan section. There is a moderate risk of morbidity with further treatment, especially from use of prescription strength medications and possible side effects of these medications which include upset stomach and cardiac/renal issues with long term use were discussed. (3) I recommended that the patient follow-up with their medical physician to discuss any significant specific potential issues with long term use such as interactions with current medications or with exacerbation of underlying medical morbidities.   Attestation: I, Marlin Sibley , attest that this documentation has been prepared under the direction and in the presence of Provider Johann Gaona MD. The documentation recorded by the scribe, in my presence, accurately reflects the service I personally performed, and the decisions made by me with my edits as appropriate. Johann Gaona MD

## 2024-06-24 ENCOUNTER — APPOINTMENT (OUTPATIENT)
Dept: INTERNAL MEDICINE | Facility: CLINIC | Age: 64
End: 2024-06-24

## 2024-07-18 ENCOUNTER — APPOINTMENT (OUTPATIENT)
Dept: INTERNAL MEDICINE | Facility: CLINIC | Age: 64
End: 2024-07-18
Payer: MEDICARE

## 2024-07-18 VITALS
HEART RATE: 85 BPM | WEIGHT: 195 LBS | DIASTOLIC BLOOD PRESSURE: 80 MMHG | OXYGEN SATURATION: 97 % | RESPIRATION RATE: 16 BRPM | HEIGHT: 73 IN | TEMPERATURE: 98.6 F | BODY MASS INDEX: 25.84 KG/M2 | SYSTOLIC BLOOD PRESSURE: 140 MMHG

## 2024-07-18 DIAGNOSIS — F41.9 ANXIETY DISORDER, UNSPECIFIED: ICD-10-CM

## 2024-07-18 DIAGNOSIS — I26.99 OTHER PULMONARY EMBOLISM W/OUT ACUTE COR PULMONALE: ICD-10-CM

## 2024-07-18 DIAGNOSIS — D68.2 HEREDITARY DEFICIENCY OF OTHER CLOTTING FACTORS: ICD-10-CM

## 2024-07-18 PROCEDURE — 99214 OFFICE O/P EST MOD 30 MIN: CPT

## 2024-07-26 NOTE — ASU PATIENT PROFILE, ADULT - INTERNATIONAL TRAVEL
Case Management Discharge Note      Final Note: Patient has a bed at [facility] for respite care. I have confirmed this with Cely at [facility] and the patient/family. Patient will need to be at the facility before 1400 today. Family verbalized understanding and agreeable. RN will need to call report to [phone] and fax the discharge summary to [phone]. Fax the discharge summary first before calling report. [facility] is the pharmacy used by the facility. I have updated this in Epic.         Selected Continued Care - Admitted Since 7/22/2024       Destination Coordination complete.      Service Provider Selected Services Address Phone Fax Patient Preferred    [facility] [address], [facility] [address] [phone] --              Durable Medical Equipment    No services have been selected for the patient.                Dialysis/Infusion    No services have been selected for the patient.                Home Medical Care    No services have been selected for the patient.                Therapy    No services have been selected for the patient.                Community Resources    No services have been selected for the patient.                Community & DME    No services have been selected for the patient.                    Transportation Services  Private: Car    Final Discharge Disposition Code: 01 - home or self-care   No

## 2024-08-15 NOTE — H&P PST ADULT - DENTITION
Writer returned call to Yolanda at [facility] pharmacy after speaking with Dr. Vidal. Explained that Dr. Vidal's plan is for patient to remain on ativan PRN and start taking the klonopin scheduled for dizziness and anxiety. She will start with slowly uptitrating from 0.25 mg at bedtime to 0.5 mg BID of the klonopin, with the plan to try to minimize ativan use/take PRN only.      FELIX Mckeon RN Care Coordinator  Neurology/Neurosurgery/PM&R/Pain Management      normal

## 2024-09-16 ENCOUNTER — RX RENEWAL (OUTPATIENT)
Age: 64
End: 2024-09-16

## 2024-10-10 ENCOUNTER — RX RENEWAL (OUTPATIENT)
Age: 64
End: 2024-10-10

## 2024-12-02 ENCOUNTER — RX RENEWAL (OUTPATIENT)
Age: 64
End: 2024-12-02

## 2024-12-05 ENCOUNTER — APPOINTMENT (OUTPATIENT)
Dept: INTERNAL MEDICINE | Facility: CLINIC | Age: 64
End: 2024-12-05
Payer: MEDICARE

## 2024-12-05 VITALS
DIASTOLIC BLOOD PRESSURE: 81 MMHG | WEIGHT: 205.13 LBS | HEIGHT: 73 IN | HEART RATE: 79 BPM | TEMPERATURE: 98 F | OXYGEN SATURATION: 99 % | BODY MASS INDEX: 27.19 KG/M2 | SYSTOLIC BLOOD PRESSURE: 160 MMHG

## 2024-12-05 DIAGNOSIS — I10 ESSENTIAL (PRIMARY) HYPERTENSION: ICD-10-CM

## 2024-12-05 DIAGNOSIS — F41.9 ANXIETY DISORDER, UNSPECIFIED: ICD-10-CM

## 2024-12-05 DIAGNOSIS — Z00.00 ENCOUNTER FOR GENERAL ADULT MEDICAL EXAMINATION W/OUT ABNORMAL FINDINGS: ICD-10-CM

## 2024-12-05 PROCEDURE — 99214 OFFICE O/P EST MOD 30 MIN: CPT

## 2025-01-02 DIAGNOSIS — R79.89 OTHER SPECIFIED ABNORMAL FINDINGS OF BLOOD CHEMISTRY: ICD-10-CM

## 2025-01-06 ENCOUNTER — APPOINTMENT (OUTPATIENT)
Dept: INTERNAL MEDICINE | Facility: CLINIC | Age: 65
End: 2025-01-06

## 2025-01-15 NOTE — ASU PATIENT PROFILE, ADULT - PAIN CHRONIC, PROFILE
[de-identified] : Patient is doing well and continues to improve. He will continue with PT and home exercises.       
yes

## 2025-03-07 ENCOUNTER — NON-APPOINTMENT (OUTPATIENT)
Age: 65
End: 2025-03-07

## 2025-03-31 ENCOUNTER — RX RENEWAL (OUTPATIENT)
Age: 65
End: 2025-03-31

## 2025-07-25 ENCOUNTER — APPOINTMENT (OUTPATIENT)
Dept: INTERNAL MEDICINE | Facility: CLINIC | Age: 65
End: 2025-07-25

## 2025-07-28 ENCOUNTER — RX RENEWAL (OUTPATIENT)
Age: 65
End: 2025-07-28

## 2025-09-08 ENCOUNTER — RX RENEWAL (OUTPATIENT)
Age: 65
End: 2025-09-08